# Patient Record
Sex: MALE | Race: WHITE | NOT HISPANIC OR LATINO | URBAN - METROPOLITAN AREA
[De-identification: names, ages, dates, MRNs, and addresses within clinical notes are randomized per-mention and may not be internally consistent; named-entity substitution may affect disease eponyms.]

---

## 2019-01-25 ENCOUNTER — OUTPATIENT (OUTPATIENT)
Dept: OUTPATIENT SERVICES | Facility: HOSPITAL | Age: 59
LOS: 1 days | Discharge: HOME | End: 2019-01-25

## 2019-01-25 ENCOUNTER — APPOINTMENT (OUTPATIENT)
Dept: SPEECH THERAPY | Facility: CLINIC | Age: 59
End: 2019-01-25

## 2019-01-25 DIAGNOSIS — Z87.898 PERSONAL HISTORY OF OTHER SPECIFIED CONDITIONS: Chronic | ICD-10-CM

## 2019-01-25 DIAGNOSIS — Z98.89 OTHER SPECIFIED POSTPROCEDURAL STATES: Chronic | ICD-10-CM

## 2019-01-25 DIAGNOSIS — D48.9 NEOPLASM OF UNCERTAIN BEHAVIOR, UNSPECIFIED: Chronic | ICD-10-CM

## 2019-01-25 DIAGNOSIS — H26.9 UNSPECIFIED CATARACT: Chronic | ICD-10-CM

## 2019-01-29 DIAGNOSIS — H90.5 UNSPECIFIED SENSORINEURAL HEARING LOSS: ICD-10-CM

## 2019-09-04 ENCOUNTER — APPOINTMENT (OUTPATIENT)
Dept: SURGERY | Facility: CLINIC | Age: 59
End: 2019-09-04
Payer: COMMERCIAL

## 2019-09-04 VITALS
DIASTOLIC BLOOD PRESSURE: 81 MMHG | WEIGHT: 193 LBS | SYSTOLIC BLOOD PRESSURE: 131 MMHG | TEMPERATURE: 98.4 F | HEART RATE: 64 BPM | HEIGHT: 69 IN | BODY MASS INDEX: 28.58 KG/M2 | RESPIRATION RATE: 16 BRPM | OXYGEN SATURATION: 97 %

## 2019-09-04 PROCEDURE — 99244 OFF/OP CNSLTJ NEW/EST MOD 40: CPT

## 2019-09-04 RX ORDER — TELMISARTAN 20 MG/1
20 TABLET ORAL
Refills: 0 | Status: ACTIVE | COMMUNITY

## 2019-09-04 RX ORDER — OMEPRAZOLE 20 MG/1
20 CAPSULE, DELAYED RELEASE ORAL
Refills: 0 | Status: ACTIVE | COMMUNITY

## 2019-09-04 RX ORDER — METFORMIN HYDROCHLORIDE 500 MG/1
500 TABLET, COATED ORAL
Refills: 0 | Status: ACTIVE | COMMUNITY

## 2019-09-04 RX ORDER — MELOXICAM 15 MG/1
15 TABLET ORAL
Refills: 0 | Status: ACTIVE | COMMUNITY

## 2019-09-04 NOTE — HISTORY OF PRESENT ILLNESS
[de-identified] : This 58-year-old gentleman underwent incision and drainage of a an infected sebaceous cyst on the posterior neck about 5 years ago. He presents today with a cyst in the same area which is nontender

## 2019-09-04 NOTE — PHYSICAL EXAM
[Normal Breath Sounds] : Normal breath sounds [Normal Heart Sounds] : normal heart sounds [Calm] : calm [No Rash or Lesion] : No rash or lesion [de-identified] : ormal [de-identified] : ormal [de-identified] : there is a firm mass of the posterior neck measuring 2 x 3 cm. This is nontender nonfluctuant and there is no erythema. [de-identified] : soft and nontender [de-identified] : normal [de-identified] : ormal

## 2019-09-04 NOTE — CONSULT LETTER
[Dear  ___] : Dear  [unfilled], [Consult Letter:] : I had the pleasure of evaluating your patient, [unfilled]. [Please see my note below.] : Please see my note below. [Sincerely,] : Sincerely, [FreeTextEntry3] : Joe Reddy M.D.

## 2019-09-04 NOTE — PLAN
[FreeTextEntry1] : This patient is suffering from recurrent sebaceous cyst on the posterior neck. I will schedule this patient for excision of this lesion in the upcoming weeks

## 2019-10-04 ENCOUNTER — OUTPATIENT (OUTPATIENT)
Dept: OUTPATIENT SERVICES | Facility: HOSPITAL | Age: 59
LOS: 1 days | End: 2019-10-04
Payer: COMMERCIAL

## 2019-10-04 VITALS
TEMPERATURE: 98 F | DIASTOLIC BLOOD PRESSURE: 80 MMHG | RESPIRATION RATE: 18 BRPM | HEART RATE: 63 BPM | SYSTOLIC BLOOD PRESSURE: 124 MMHG | OXYGEN SATURATION: 97 % | WEIGHT: 195.11 LBS | HEIGHT: 69 IN

## 2019-10-04 DIAGNOSIS — Z98.89 OTHER SPECIFIED POSTPROCEDURAL STATES: Chronic | ICD-10-CM

## 2019-10-04 DIAGNOSIS — L72.3 SEBACEOUS CYST: ICD-10-CM

## 2019-10-04 DIAGNOSIS — J34.2 DEVIATED NASAL SEPTUM: Chronic | ICD-10-CM

## 2019-10-04 DIAGNOSIS — Z87.898 PERSONAL HISTORY OF OTHER SPECIFIED CONDITIONS: Chronic | ICD-10-CM

## 2019-10-04 DIAGNOSIS — H26.9 UNSPECIFIED CATARACT: Chronic | ICD-10-CM

## 2019-10-04 DIAGNOSIS — Z01.818 ENCOUNTER FOR OTHER PREPROCEDURAL EXAMINATION: ICD-10-CM

## 2019-10-04 DIAGNOSIS — D48.9 NEOPLASM OF UNCERTAIN BEHAVIOR, UNSPECIFIED: Chronic | ICD-10-CM

## 2019-10-04 DIAGNOSIS — L72.9 FOLLICULAR CYST OF THE SKIN AND SUBCUTANEOUS TISSUE, UNSPECIFIED: Chronic | ICD-10-CM

## 2019-10-04 PROCEDURE — 80048 BASIC METABOLIC PNL TOTAL CA: CPT

## 2019-10-04 PROCEDURE — 85027 COMPLETE CBC AUTOMATED: CPT

## 2019-10-04 PROCEDURE — 83036 HEMOGLOBIN GLYCOSYLATED A1C: CPT

## 2019-10-04 PROCEDURE — G0463: CPT

## 2019-10-04 RX ORDER — LIDOCAINE HCL 20 MG/ML
0.2 VIAL (ML) INJECTION ONCE
Refills: 0 | Status: DISCONTINUED | OUTPATIENT
Start: 2019-10-08 | End: 2019-10-25

## 2019-10-04 RX ORDER — SODIUM CHLORIDE 9 MG/ML
3 INJECTION INTRAMUSCULAR; INTRAVENOUS; SUBCUTANEOUS EVERY 8 HOURS
Refills: 0 | Status: DISCONTINUED | OUTPATIENT
Start: 2019-10-08 | End: 2019-10-25

## 2019-10-04 RX ORDER — CHLORHEXIDINE GLUCONATE 213 G/1000ML
1 SOLUTION TOPICAL DAILY
Refills: 0 | Status: DISCONTINUED | OUTPATIENT
Start: 2019-10-08 | End: 2019-10-25

## 2019-10-04 NOTE — H&P PST ADULT - NSICDXPROBLEM_GEN_ALL_CORE_FT
PROBLEM DIAGNOSES  Problem: Sebaceous cyst  Assessment and Plan: coming in for excision of cyst posterior neck

## 2019-10-04 NOTE — H&P PST ADULT - NSICDXPASTSURGICALHX_GEN_ALL_CORE_FT
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

## 2019-10-04 NOTE — H&P PST ADULT - NSICDXFAMILYHX_GEN_ALL_CORE_FT
FAMILY HISTORY:  Father  Still living? No  Family history of colon cancer, Age at diagnosis: Age Unknown  Family history of diabetes mellitus, Age at diagnosis: Age Unknown    Mother  Still living? Yes, Estimated age: Age Unknown  Family history of aortic aneurysm, Age at diagnosis: Age Unknown  Family history of breast cancer, Age at diagnosis: Age Unknown    Grandparent  Still living? No  Family history of aortic aneurysm, Age at diagnosis: Age Unknown

## 2019-10-04 NOTE — H&P PST ADULT - NSICDXPASTMEDICALHX_GEN_ALL_CORE_FT
PAST MEDICAL HISTORY:  Chronic maxillary sinusitis     Diabetes mellitus pre    Dyslipidemia     Essential hypertension     GERD (gastroesophageal reflux disease)     [place] (hard of hearing) bilat hearing aides    Obstructive sleep apnea h/o UPPP done 5/2005 pt states never retested & no longer required CPAP mechine    Overactive bladder     Weight loss 60lbs

## 2019-10-07 ENCOUNTER — TRANSCRIPTION ENCOUNTER (OUTPATIENT)
Age: 59
End: 2019-10-07

## 2019-10-08 ENCOUNTER — OUTPATIENT (OUTPATIENT)
Dept: OUTPATIENT SERVICES | Facility: HOSPITAL | Age: 59
LOS: 1 days | End: 2019-10-08
Payer: COMMERCIAL

## 2019-10-08 ENCOUNTER — APPOINTMENT (OUTPATIENT)
Dept: SURGERY | Facility: HOSPITAL | Age: 59
End: 2019-10-08
Payer: COMMERCIAL

## 2019-10-08 ENCOUNTER — RESULT REVIEW (OUTPATIENT)
Age: 59
End: 2019-10-08

## 2019-10-08 VITALS
RESPIRATION RATE: 14 BRPM | WEIGHT: 195.11 LBS | SYSTOLIC BLOOD PRESSURE: 119 MMHG | HEART RATE: 71 BPM | OXYGEN SATURATION: 95 % | HEIGHT: 69 IN | TEMPERATURE: 98 F | DIASTOLIC BLOOD PRESSURE: 75 MMHG

## 2019-10-08 VITALS
SYSTOLIC BLOOD PRESSURE: 120 MMHG | OXYGEN SATURATION: 98 % | RESPIRATION RATE: 16 BRPM | DIASTOLIC BLOOD PRESSURE: 78 MMHG | TEMPERATURE: 98 F | HEART RATE: 69 BPM

## 2019-10-08 DIAGNOSIS — D48.9 NEOPLASM OF UNCERTAIN BEHAVIOR, UNSPECIFIED: Chronic | ICD-10-CM

## 2019-10-08 DIAGNOSIS — J34.2 DEVIATED NASAL SEPTUM: Chronic | ICD-10-CM

## 2019-10-08 DIAGNOSIS — H26.9 UNSPECIFIED CATARACT: Chronic | ICD-10-CM

## 2019-10-08 DIAGNOSIS — Z98.89 OTHER SPECIFIED POSTPROCEDURAL STATES: Chronic | ICD-10-CM

## 2019-10-08 DIAGNOSIS — Z87.898 PERSONAL HISTORY OF OTHER SPECIFIED CONDITIONS: Chronic | ICD-10-CM

## 2019-10-08 DIAGNOSIS — L72.9 FOLLICULAR CYST OF THE SKIN AND SUBCUTANEOUS TISSUE, UNSPECIFIED: Chronic | ICD-10-CM

## 2019-10-08 DIAGNOSIS — L72.3 SEBACEOUS CYST: ICD-10-CM

## 2019-10-08 LAB — GLUCOSE BLDC GLUCOMTR-MCNC: 119 MG/DL — HIGH (ref 70–99)

## 2019-10-08 PROCEDURE — 88304 TISSUE EXAM BY PATHOLOGIST: CPT | Mod: 26

## 2019-10-08 PROCEDURE — 11424 EXC H-F-NK-SP B9+MARG 3.1-4: CPT

## 2019-10-08 PROCEDURE — 88304 TISSUE EXAM BY PATHOLOGIST: CPT

## 2019-10-08 PROCEDURE — 82962 GLUCOSE BLOOD TEST: CPT

## 2019-10-08 RX ORDER — SODIUM CHLORIDE 9 MG/ML
1000 INJECTION, SOLUTION INTRAVENOUS
Refills: 0 | Status: DISCONTINUED | OUTPATIENT
Start: 2019-10-08 | End: 2019-10-25

## 2019-10-08 RX ORDER — ONDANSETRON 8 MG/1
4 TABLET, FILM COATED ORAL ONCE
Refills: 0 | Status: DISCONTINUED | OUTPATIENT
Start: 2019-10-08 | End: 2019-10-25

## 2019-10-08 RX ORDER — OXYCODONE HYDROCHLORIDE 5 MG/1
5 TABLET ORAL ONCE
Refills: 0 | Status: DISCONTINUED | OUTPATIENT
Start: 2019-10-08 | End: 2019-10-08

## 2019-10-08 RX ORDER — CELECOXIB 200 MG/1
200 CAPSULE ORAL ONCE
Refills: 0 | Status: COMPLETED | OUTPATIENT
Start: 2019-10-08 | End: 2019-10-08

## 2019-10-08 RX ORDER — ACETAMINOPHEN 500 MG
1000 TABLET ORAL ONCE
Refills: 0 | Status: COMPLETED | OUTPATIENT
Start: 2019-10-08 | End: 2019-10-08

## 2019-10-08 RX ADMIN — CELECOXIB 200 MILLIGRAM(S): 200 CAPSULE ORAL at 14:06

## 2019-10-08 RX ADMIN — CHLORHEXIDINE GLUCONATE 1 APPLICATION(S): 213 SOLUTION TOPICAL at 14:05

## 2019-10-08 RX ADMIN — Medication 1000 MILLIGRAM(S): at 14:05

## 2019-10-08 NOTE — ASU DISCHARGE PLAN (ADULT/PEDIATRIC) - ASU DC SPECIAL INSTRUCTIONSFT
DIET: You may resume your regular diet.  BATHING: Please do not submerge wound underwater for one week. You may shower and/or sponge bathe starting tomorrow.   ACTIVITY: No heavy lifting or straining for 2 weeks. Otherwise, you may return to your usual level of physical activity. If you are taking narcotic pain medication (such as Percocet) DO NOT drive or make important decisions.  NOTIFY YOUR SURGEON IF: You have any bleeding that does not stop, any pus draining from your wound(s), any fever (over 100.4 F) or chills, persistent nausea/vomiting, persistent diarrhea, or if your pain is not controlled on your discharge pain medications.  WOUND CARE: Please remove outer clear dressing after 24 hours. Underneath outer dressing you have stitches in place. They will be removed on your follow up visit with Dr. Reddy. Please keep incisions clean and dry. Please do not scrub or rub incisions. Do not use lotion or powder on incisions.

## 2019-10-08 NOTE — PRE-ANESTHESIA EVALUATION ADULT - NSANTHPMHFT_GEN_ALL_CORE
has hx of RYAN sionce uvulectomy plus weight loss no snoring/never given cpap  bgl 119  gerd controlled

## 2019-10-08 NOTE — ASU PATIENT PROFILE, ADULT - ABILITY TO HEAR (WITH HEARING AID OR HEARING APPLIANCE IF NORMALLY USED):
Severely Impaired: absence of useful hearing/bilateral hearing aides Severely Impaired: absence of useful hearing/bilateral hearing aides pt wearing

## 2019-10-08 NOTE — ASU DISCHARGE PLAN (ADULT/PEDIATRIC) - CARE PROVIDER_API CALL
Joe Reddy)  Surgery  [address], [address]  [place] [address]  Phone: [phone]  Fax: [phone]  Follow Up Time:

## 2019-10-08 NOTE — ASU DISCHARGE PLAN (ADULT/PEDIATRIC) - NURSING INSTRUCTIONS
Next dose of Tylenol will be on or after 8pm tonight, If needed for pain/cramps. Your first dose of Tylenol was given at  2pm_. Do not exceed more than 4000mg of Tylenol in one 24 hour setting.  Next dose of NSAIDS (Motrin/Alleve) will be on or after 6pm today if needed for pain

## 2019-10-08 NOTE — ASU PATIENT PROFILE, ADULT - PSH
Bilateral cataracts  11/2013  Deviated septum  nasal cyst 10/2015  Giant cell tumor  excision left tibia 12/2004 - benign  H/O arthroscopy of right knee  meniscus injury 2002  H/O lipoma  eexcision from chest & back 4/2011 2012 - chest & axilia  History of cholecystectomy  2003  History of tonsillectomy  1968  S/P UPPP (uvulopalatopharyngoplasty)  h/o 2005  Scrotal cyst  12/2011

## 2019-10-09 RX ORDER — ASPIRIN/CALCIUM CARB/MAGNESIUM 324 MG
1 TABLET ORAL
Qty: 0 | Refills: 0 | DISCHARGE
Start: 2019-10-09

## 2019-10-14 LAB — SURGICAL PATHOLOGY STUDY: SIGNIFICANT CHANGE UP

## 2019-10-23 ENCOUNTER — APPOINTMENT (OUTPATIENT)
Dept: SURGERY | Facility: CLINIC | Age: 59
End: 2019-10-23
Payer: COMMERCIAL

## 2019-10-23 VITALS
TEMPERATURE: 98.5 F | SYSTOLIC BLOOD PRESSURE: 135 MMHG | DIASTOLIC BLOOD PRESSURE: 88 MMHG | HEART RATE: 82 BPM | OXYGEN SATURATION: 96 % | RESPIRATION RATE: 16 BRPM

## 2019-10-23 DIAGNOSIS — K42.9 UMBILICAL HERNIA W/OUT OBSTRUCTION OR GANGRENE: ICD-10-CM

## 2019-10-23 DIAGNOSIS — Z22.322 CARRIER OR SUSPECTED CARRIER OF METHICILLIN RESISTANT STAPHYLOCOCCUS AUREUS: ICD-10-CM

## 2019-10-23 DIAGNOSIS — Z87.2 PERSONAL HISTORY OF DISEASES OF THE SKIN AND SUBCUTANEOUS TISSUE: ICD-10-CM

## 2019-10-23 DIAGNOSIS — Z09 ENCOUNTER FOR FOLLOW-UP EXAMINATION AFTER COMPLETED TREATMENT FOR CONDITIONS OTHER THAN MALIGNANT NEOPLASM: ICD-10-CM

## 2019-10-23 DIAGNOSIS — M62.08 SEPARATION OF MUSCLE (NONTRAUMATIC), OTHER SITE: ICD-10-CM

## 2019-10-23 PROCEDURE — 99214 OFFICE O/P EST MOD 30 MIN: CPT

## 2019-10-23 NOTE — HISTORY OF PRESENT ILLNESS
[de-identified] : This patient has no complaints referring to his neck surgery. He complains of a large hernia of the abdominal wall. He has asked me to assess his hernia.  He denies any pain nausea emesis or change in bowel or urinary habits

## 2019-10-23 NOTE — ASSESSMENT
[FreeTextEntry1] : This patient is recovering nicely from his excision of neck mass. I have asked him to obtain a CAT scan of the abdomen and pelvis to better define the incisional hernia about the umbilicus.

## 2019-10-23 NOTE — PHYSICAL EXAM
[de-identified] : The wound is clean dry and well healed. Sutures were removed and Steri-Strips applied. [de-identified] : Soft nontender and nondistended. Diastasis recti is present. There is a small reducible nontender umbilical hernia there is the suspicion of a second hernia defect just superior to the umbilicus.

## 2019-11-26 ENCOUNTER — APPOINTMENT (OUTPATIENT)
Dept: SPEECH THERAPY | Facility: CLINIC | Age: 59
End: 2019-11-26

## 2019-11-27 ENCOUNTER — TRANSCRIPTION ENCOUNTER (OUTPATIENT)
Age: 59
End: 2019-11-27

## 2019-12-02 RX ORDER — CEFDINIR 250 MG/5ML
1 POWDER, FOR SUSPENSION ORAL
Qty: 0 | Refills: 0 | DISCHARGE
Start: 2019-12-02 | End: 2019-12-11

## 2019-12-05 DIAGNOSIS — K43.2 INCISIONAL HERNIA W/OUT OBSTRUCTION OR GANGRENE: ICD-10-CM

## 2019-12-12 ENCOUNTER — APPOINTMENT (OUTPATIENT)
Dept: SPEECH THERAPY | Facility: CLINIC | Age: 59
End: 2019-12-12

## 2020-01-04 ENCOUNTER — EMERGENCY (EMERGENCY)
Facility: HOSPITAL | Age: 60
LOS: 0 days | Discharge: HOME | End: 2020-01-04
Attending: EMERGENCY MEDICINE
Payer: COMMERCIAL

## 2020-01-04 ENCOUNTER — TRANSCRIPTION ENCOUNTER (OUTPATIENT)
Age: 60
End: 2020-01-04

## 2020-01-04 VITALS
SYSTOLIC BLOOD PRESSURE: 134 MMHG | OXYGEN SATURATION: 98 % | HEART RATE: 91 BPM | TEMPERATURE: 101 F | DIASTOLIC BLOOD PRESSURE: 74 MMHG | RESPIRATION RATE: 18 BRPM

## 2020-01-04 VITALS — HEIGHT: 69 IN | WEIGHT: 195.11 LBS

## 2020-01-04 DIAGNOSIS — K21.9 GASTRO-ESOPHAGEAL REFLUX DISEASE WITHOUT ESOPHAGITIS: ICD-10-CM

## 2020-01-04 DIAGNOSIS — Z98.89 OTHER SPECIFIED POSTPROCEDURAL STATES: Chronic | ICD-10-CM

## 2020-01-04 DIAGNOSIS — R31.9 HEMATURIA, UNSPECIFIED: ICD-10-CM

## 2020-01-04 DIAGNOSIS — I10 ESSENTIAL (PRIMARY) HYPERTENSION: ICD-10-CM

## 2020-01-04 DIAGNOSIS — Z87.898 PERSONAL HISTORY OF OTHER SPECIFIED CONDITIONS: Chronic | ICD-10-CM

## 2020-01-04 DIAGNOSIS — N41.9 INFLAMMATORY DISEASE OF PROSTATE, UNSPECIFIED: ICD-10-CM

## 2020-01-04 DIAGNOSIS — N40.0 BENIGN PROSTATIC HYPERPLASIA WITHOUT LOWER URINARY TRACT SYMPTOMS: ICD-10-CM

## 2020-01-04 DIAGNOSIS — D48.9 NEOPLASM OF UNCERTAIN BEHAVIOR, UNSPECIFIED: Chronic | ICD-10-CM

## 2020-01-04 DIAGNOSIS — J34.2 DEVIATED NASAL SEPTUM: Chronic | ICD-10-CM

## 2020-01-04 DIAGNOSIS — H26.9 UNSPECIFIED CATARACT: Chronic | ICD-10-CM

## 2020-01-04 DIAGNOSIS — R73.03 PREDIABETES: ICD-10-CM

## 2020-01-04 DIAGNOSIS — L72.9 FOLLICULAR CYST OF THE SKIN AND SUBCUTANEOUS TISSUE, UNSPECIFIED: Chronic | ICD-10-CM

## 2020-01-04 LAB
ALBUMIN SERPL ELPH-MCNC: 4 G/DL — SIGNIFICANT CHANGE UP (ref 3.3–5)
ALP SERPL-CCNC: 75 U/L — SIGNIFICANT CHANGE UP (ref 40–120)
ALT FLD-CCNC: 34 U/L — SIGNIFICANT CHANGE UP (ref 12–78)
ANION GAP SERPL CALC-SCNC: 5 MMOL/L — SIGNIFICANT CHANGE UP (ref 5–17)
APPEARANCE UR: ABNORMAL
APTT BLD: 27.3 SEC — LOW (ref 27.5–36.3)
AST SERPL-CCNC: 16 U/L — SIGNIFICANT CHANGE UP (ref 15–37)
BASOPHILS # BLD AUTO: 0.02 K/UL — SIGNIFICANT CHANGE UP (ref 0–0.2)
BASOPHILS NFR BLD AUTO: 0.4 % — SIGNIFICANT CHANGE UP (ref 0–2)
BILIRUB SERPL-MCNC: 2.1 MG/DL — HIGH (ref 0.2–1.2)
BILIRUB UR-MCNC: ABNORMAL
BUN SERPL-MCNC: 16 MG/DL — SIGNIFICANT CHANGE UP (ref 7–23)
CALCIUM SERPL-MCNC: 10 MG/DL — SIGNIFICANT CHANGE UP (ref 8.5–10.1)
CHLORIDE SERPL-SCNC: 102 MMOL/L — SIGNIFICANT CHANGE UP (ref 96–108)
CO2 SERPL-SCNC: 29 MMOL/L — SIGNIFICANT CHANGE UP (ref 22–31)
COLOR SPEC: ABNORMAL
CREAT SERPL-MCNC: 0.7 MG/DL — SIGNIFICANT CHANGE UP (ref 0.5–1.3)
DIFF PNL FLD: ABNORMAL
EOSINOPHIL # BLD AUTO: 0.06 K/UL — SIGNIFICANT CHANGE UP (ref 0–0.5)
EOSINOPHIL NFR BLD AUTO: 1.1 % — SIGNIFICANT CHANGE UP (ref 0–6)
GLUCOSE SERPL-MCNC: 141 MG/DL — HIGH (ref 70–99)
GLUCOSE UR QL: NEGATIVE MG/DL — SIGNIFICANT CHANGE UP
HCT VFR BLD CALC: 48.5 % — SIGNIFICANT CHANGE UP (ref 39–50)
HGB BLD-MCNC: 16.2 G/DL — SIGNIFICANT CHANGE UP (ref 13–17)
IMM GRANULOCYTES NFR BLD AUTO: 0.2 % — SIGNIFICANT CHANGE UP (ref 0–1.5)
INR BLD: 1.1 RATIO — SIGNIFICANT CHANGE UP (ref 0.88–1.16)
KETONES UR-MCNC: ABNORMAL
LEUKOCYTE ESTERASE UR-ACNC: ABNORMAL
LYMPHOCYTES # BLD AUTO: 0.8 K/UL — LOW (ref 1–3.3)
LYMPHOCYTES # BLD AUTO: 15.3 % — SIGNIFICANT CHANGE UP (ref 13–44)
MCHC RBC-ENTMCNC: 29 PG — SIGNIFICANT CHANGE UP (ref 27–34)
MCHC RBC-ENTMCNC: 33.4 GM/DL — SIGNIFICANT CHANGE UP (ref 32–36)
MCV RBC AUTO: 86.8 FL — SIGNIFICANT CHANGE UP (ref 80–100)
MONOCYTES # BLD AUTO: 0.82 K/UL — SIGNIFICANT CHANGE UP (ref 0–0.9)
MONOCYTES NFR BLD AUTO: 15.7 % — HIGH (ref 2–14)
NEUTROPHILS # BLD AUTO: 3.51 K/UL — SIGNIFICANT CHANGE UP (ref 1.8–7.4)
NEUTROPHILS NFR BLD AUTO: 67.3 % — SIGNIFICANT CHANGE UP (ref 43–77)
NITRITE UR-MCNC: POSITIVE
PH UR: 6.5 — SIGNIFICANT CHANGE UP (ref 5–8)
PLATELET # BLD AUTO: 160 K/UL — SIGNIFICANT CHANGE UP (ref 150–400)
POTASSIUM SERPL-MCNC: 4.2 MMOL/L — SIGNIFICANT CHANGE UP (ref 3.5–5.3)
POTASSIUM SERPL-SCNC: 4.2 MMOL/L — SIGNIFICANT CHANGE UP (ref 3.5–5.3)
PROT SERPL-MCNC: 7.5 GM/DL — SIGNIFICANT CHANGE UP (ref 6–8.3)
PROT UR-MCNC: 500 MG/DL
PROTHROM AB SERPL-ACNC: 12.2 SEC — SIGNIFICANT CHANGE UP (ref 10–12.9)
RBC # BLD: 5.59 M/UL — SIGNIFICANT CHANGE UP (ref 4.2–5.8)
RBC # FLD: 13.5 % — SIGNIFICANT CHANGE UP (ref 10.3–14.5)
SODIUM SERPL-SCNC: 136 MMOL/L — SIGNIFICANT CHANGE UP (ref 135–145)
SP GR SPEC: 1.02 — SIGNIFICANT CHANGE UP (ref 1.01–1.02)
UROBILINOGEN FLD QL: 4 MG/DL
WBC # BLD: 5.22 K/UL — SIGNIFICANT CHANGE UP (ref 3.8–10.5)
WBC # FLD AUTO: 5.22 K/UL — SIGNIFICANT CHANGE UP (ref 3.8–10.5)

## 2020-01-04 PROCEDURE — 36415 COLL VENOUS BLD VENIPUNCTURE: CPT

## 2020-01-04 PROCEDURE — 80053 COMPREHEN METABOLIC PANEL: CPT

## 2020-01-04 PROCEDURE — 96374 THER/PROPH/DIAG INJ IV PUSH: CPT

## 2020-01-04 PROCEDURE — 87186 SC STD MICRODIL/AGAR DIL: CPT

## 2020-01-04 PROCEDURE — 85610 PROTHROMBIN TIME: CPT

## 2020-01-04 PROCEDURE — 74176 CT ABD & PELVIS W/O CONTRAST: CPT | Mod: 26

## 2020-01-04 PROCEDURE — 74176 CT ABD & PELVIS W/O CONTRAST: CPT

## 2020-01-04 PROCEDURE — 87040 BLOOD CULTURE FOR BACTERIA: CPT

## 2020-01-04 PROCEDURE — 86901 BLOOD TYPING SEROLOGIC RH(D): CPT

## 2020-01-04 PROCEDURE — 99284 EMERGENCY DEPT VISIT MOD MDM: CPT | Mod: 25

## 2020-01-04 PROCEDURE — 99284 EMERGENCY DEPT VISIT MOD MDM: CPT

## 2020-01-04 PROCEDURE — 81001 URINALYSIS AUTO W/SCOPE: CPT

## 2020-01-04 PROCEDURE — 87086 URINE CULTURE/COLONY COUNT: CPT

## 2020-01-04 PROCEDURE — 86850 RBC ANTIBODY SCREEN: CPT

## 2020-01-04 PROCEDURE — 85025 COMPLETE CBC W/AUTO DIFF WBC: CPT

## 2020-01-04 PROCEDURE — 86900 BLOOD TYPING SEROLOGIC ABO: CPT

## 2020-01-04 PROCEDURE — 85730 THROMBOPLASTIN TIME PARTIAL: CPT

## 2020-01-04 RX ORDER — CEFTRIAXONE 500 MG/1
1000 INJECTION, POWDER, FOR SOLUTION INTRAMUSCULAR; INTRAVENOUS ONCE
Refills: 0 | Status: DISCONTINUED | OUTPATIENT
Start: 2020-01-04 | End: 2020-01-04

## 2020-01-04 RX ORDER — AZTREONAM 2 G
1 VIAL (EA) INJECTION
Qty: 20 | Refills: 0
Start: 2020-01-04 | End: 2020-01-13

## 2020-01-04 RX ORDER — ACETAMINOPHEN 500 MG
1000 TABLET ORAL ONCE
Refills: 0 | Status: COMPLETED | OUTPATIENT
Start: 2020-01-04 | End: 2020-01-04

## 2020-01-04 RX ORDER — CEFTRIAXONE 500 MG/1
1000 INJECTION, POWDER, FOR SOLUTION INTRAMUSCULAR; INTRAVENOUS ONCE
Refills: 0 | Status: COMPLETED | OUTPATIENT
Start: 2020-01-04 | End: 2020-01-04

## 2020-01-04 RX ADMIN — CEFTRIAXONE 1000 MILLIGRAM(S): 500 INJECTION, POWDER, FOR SOLUTION INTRAMUSCULAR; INTRAVENOUS at 16:07

## 2020-01-04 RX ADMIN — Medication 1000 MILLIGRAM(S): at 18:10

## 2020-01-04 NOTE — ED PROVIDER NOTE - NS_ ATTENDINGSCRIBEDETAILS _ED_A_ED_FT
I, Nathaniel Leggett MD,  performed the initial face to face bedside interview with this patient regarding history of present illness, review of symptoms and relevant past medical, social and family history.  I completed an independent physical examination.  I was the initial provider who evaluated this patient.  The history, relevant review of systems, past medical and surgical history, medical decision making, and physical examination was documented by the scribe in my presence and I attest to the accuracy of the documentation.

## 2020-01-04 NOTE — ED PROVIDER NOTE - PATIENT PORTAL LINK FT
You can access the FollowMyHealth Patient Portal offered by [facility] by registering at the following website: [email or web address]. By joining [employer]’s FollowMyHealth portal, you will also be able to view your health information using other applications (apps) compatible with our system.

## 2020-01-04 NOTE — ED STATDOCS - PMH
Chronic maxillary sinusitis    Diabetes mellitus  pre  Dyslipidemia    Essential hypertension    GERD (gastroesophageal reflux disease)    [place] (hard of hearing)  bilat hearing aides  Obstructive sleep apnea  h/o UPPP done 5/2005 pt states never retested & no longer required CPAP mechine  Overactive bladder    Umbilical hernia    Weight loss  60lbs

## 2020-01-04 NOTE — ED PROVIDER NOTE - PROGRESS NOTE DETAILS
patient feeling much better. vss. labs with + UA and ct with prostatitis/cystitis. no blood in aponte. pt wants to go home. will follow up with urologist this week. will d/c with abx and strict return precautions. Nathaniel Leggett M.D., Attending Physician

## 2020-01-04 NOTE — ED PROVIDER NOTE - NSFOLLOWUPINSTRUCTIONS_ED_ALL_ED_FT
1. return for worsening symptoms or anything concerning to you  2. take all home meds as prescribed  3. follow up with your pmd call to make an appointment  4. take bactrim as directed  5. follow up with your urologist this week    Urinary Tract Infection, Adult  ImageA urinary tract infection (UTI) is an infection of any part of the urinary tract, which includes the kidneys, ureters, bladder, and urethra. These organs make, store, and get rid of urine in the body. UTI can be a bladder infection (cystitis) or kidney infection (pyelonephritis).    What are the causes?  This infection may be caused by fungi, viruses, or bacteria. Bacteria are the most common cause of UTIs. This condition can also be caused by repeated incomplete emptying of the bladder during urination.    What increases the risk?  This condition is more likely to develop if:    You ignore your need to urinate or hold urine for long periods of time.  You do not empty your bladder completely during urination.  You wipe back to front after urinating or having a bowel movement, if you are female.  You are uncircumcised, if you are male.  You are constipated.  You have a urinary catheter that stays in place (indwelling).  You have a weak defense (immune) system.  You have a medical condition that affects your bowels, kidneys, or bladder.  You have diabetes.  You take antibiotic medicines frequently or for long periods of time, and the antibiotics no longer work well against certain types of infections (antibiotic resistance).  You take medicines that irritate your urinary tract.  You are exposed to chemicals that irritate your urinary tract.  You are female.    What are the signs or symptoms?  Symptoms of this condition include:    Fever.  Frequent urination or passing small amounts of urine frequently.  Needing to urinate urgently.  Pain or burning with urination.  Urine that smells bad or unusual.  Cloudy urine.  Pain in the lower abdomen or back.  Trouble urinating.  Blood in the urine.  Vomiting or being less hungry than normal.  Diarrhea or abdominal pain.  Vaginal discharge, if you are female.    How is this diagnosed?  This condition is diagnosed with a medical history and physical exam. You will also need to provide a urine sample to test your urine. Other tests may be done, including:    Blood tests.  Sexually transmitted disease (STD) testing.    If you have had more than one UTI, a cystoscopy or imaging studies may be done to determine the cause of the infections.    How is this treated?  Treatment for this condition often includes a combination of two or more of the following:    Antibiotic medicine.  Other medicines to treat less common causes of UTI.  Over-the-counter medicines to treat pain.  Drinking enough water to stay hydrated.    Follow these instructions at home:  Take over-the-counter and prescription medicines only as told by your health care provider.  If you were prescribed an antibiotic, take it as told by your health care provider. Do not stop taking the antibiotic even if you start to feel better.  Avoid alcohol, caffeine, tea, and carbonated beverages. They can irritate your bladder.  Drink enough fluid to keep your urine clear or pale yellow.  Keep all follow-up visits as told by your health care provider. This is important.  ImageMake sure to:    Empty your bladder often and completely. Do not hold urine for long periods of time.  Empty your bladder before and after sex.  Wipe from front to back after a bowel movement if you are female. Use each tissue one time when you wipe.    Contact a health care provider if:  You have back pain.  You have a fever.  You feel nauseous or vomit.  Your symptoms do not get better after 3 days.  Your symptoms go away and then return.  Get help right away if:  You have severe back pain or lower abdominal pain.  You are vomiting and cannot keep down any medicines or water.  This information is not intended to replace advice given to you by your health care provider. Make sure you discuss any questions you have with your health care provider.

## 2020-01-04 NOTE — ED PROVIDER NOTE - PMH
Chronic maxillary sinusitis    Diabetes mellitus  pre  Dyslipidemia    Essential hypertension    GERD (gastroesophageal reflux disease)    [place] (hard of hearing)  bilat hearing aides  Obstructive sleep apnea  h/o UPPP done 5/2005 pt states never retested & no longer required CPAP mechine  Overactive bladder    Umbilical hernia    Weight loss  60lbs

## 2020-01-04 NOTE — ED PROVIDER NOTE - CLINICAL SUMMARY MEDICAL DECISION MAKING FREE TEXT BOX
60 y/o male with a PMHx of HTN, HLD, BPH, presents to the ED c/o hematuria worsening today now with some clot, sent in by urologist for evaluation. here exam with distended bladder, mild tenderness, otherwise non focal exam. will place pt on CBI check urine, CT abd and reassess.

## 2020-01-04 NOTE — ED PROVIDER NOTE - OBJECTIVE STATEMENT
58 y/o male with a PMHx of pre-Diabetes mellitus, BPH on Flomax, Hypertension, GERD, Obstructive sleep apnea, Overactive bladder, presents to the ED c/o urinary frequency and chills for past couple days. Secondary c/o worsening hematuria beginning this morning. Pt states that couple nights ago he had urinary frequency, where he would have to urinate every hour but will only urinate "a few drops". Then, this morning noticed blood clots in urine. States he feels uncomfortable, intermittent diaphoretic, abdominal discomfort. No recent trauma. Went to Urgent Care, and was sent to [facility] for further evaluation. Has hx of catheter placement. Takes ASA 81mg daily. PMD: Dr. Jhon Rodgers in [place]. Urologist: Dr. Arvin Parks.

## 2020-01-04 NOTE — ED PROVIDER NOTE - PHYSICAL EXAMINATION
Constitutional: NAD AAOx3  Eyes: PERRLA EOMI  Head: Normocephalic atraumatic  Mouth: MMM  Cardiac: regular rate   Resp: Lungs CTAB  GI: Abd s/nt/nd  : distended bladder, mild tenderness  Neuro: CN2-12 intact  Skin: No rashes Constitutional: NAD AAOx3  Eyes: PERRLA EOMI  Head: Normocephalic atraumatic  Mouth: MMM  Cardiac: regular rate   Resp: Lungs CTAB  GI: Abd s/nt/nd  : distended bladder, mild tenderness over bladder no cvat otherwise s/nt/nd  Neuro: CN2-12 intact  Skin: No rashes

## 2020-01-04 NOTE — ED STATDOCS - PROGRESS NOTE DETAILS
Nolberto Woody for attending Dr. Donovan: 60 y/o male with a PMHx of Chronic maxillary sinusitis, umbilical hernia, Diabetes mellitus. Dyslipidemia, Essential hypertension, GERD, [place], Obstructive sleep apnea, Overactive bladder, Weight loss, presents to the ED c/o urinary frequency and chills for past couple days. Secondary c/o hematuria beginning this morning. Pt states that couple nights would have urinary frequency, where he would have to urinate every few minutes. Then, this morning noticed blood clots in urine. Went to Urgent Care, and was sent to [facility] for further evaluation. Denies fever, hx of hematuria. Had hx of catheter placement. Takes a 81mg ASA everyday. Will send to the main ED for further evaluation.

## 2020-01-04 NOTE — ED PROVIDER NOTE - NS ED ROS FT
Constitutional: No fever, +chills  Eyes: No visual changes  HEENT: No throat pain  CV: No chest pain  Resp: No SOB no cough  GI: No nausea or vomiting, +abdominal discomfort  : +urinary frequency, +urinary retention, +hematuria  MSK: No musculoskeletal pain  Skin: No rash  Neuro: No headache

## 2020-01-04 NOTE — ED ADULT NURSE NOTE - OBJECTIVE STATEMENT
Pt complains of frequent scant dark urine output, sent from urgent care for hematuria.  Three-way Dean placed, 50 cc tea-colored output prior to infusing CBI fluid.  20g PIV placed in LAC.

## 2020-01-07 ENCOUNTER — INPATIENT (INPATIENT)
Facility: HOSPITAL | Age: 60
LOS: 0 days | Discharge: HOME HEALTH | DRG: 690 | End: 2020-01-08
Attending: FAMILY MEDICINE | Admitting: FAMILY MEDICINE
Payer: COMMERCIAL

## 2020-01-07 VITALS — HEIGHT: 69 IN | WEIGHT: 192.9 LBS

## 2020-01-07 DIAGNOSIS — H26.9 UNSPECIFIED CATARACT: Chronic | ICD-10-CM

## 2020-01-07 DIAGNOSIS — Z87.898 PERSONAL HISTORY OF OTHER SPECIFIED CONDITIONS: Chronic | ICD-10-CM

## 2020-01-07 DIAGNOSIS — D48.9 NEOPLASM OF UNCERTAIN BEHAVIOR, UNSPECIFIED: Chronic | ICD-10-CM

## 2020-01-07 DIAGNOSIS — Z98.89 OTHER SPECIFIED POSTPROCEDURAL STATES: Chronic | ICD-10-CM

## 2020-01-07 DIAGNOSIS — L72.9 FOLLICULAR CYST OF THE SKIN AND SUBCUTANEOUS TISSUE, UNSPECIFIED: Chronic | ICD-10-CM

## 2020-01-07 DIAGNOSIS — N39.0 URINARY TRACT INFECTION, SITE NOT SPECIFIED: ICD-10-CM

## 2020-01-07 DIAGNOSIS — J34.2 DEVIATED NASAL SEPTUM: Chronic | ICD-10-CM

## 2020-01-07 PROBLEM — K42.9 UMBILICAL HERNIA WITHOUT OBSTRUCTION OR GANGRENE: Chronic | Status: ACTIVE | Noted: 2020-01-04

## 2020-01-07 LAB
ALBUMIN SERPL ELPH-MCNC: 3.9 G/DL — SIGNIFICANT CHANGE UP (ref 3.3–5)
ALP SERPL-CCNC: 73 U/L — SIGNIFICANT CHANGE UP (ref 40–120)
ALT FLD-CCNC: 48 U/L — SIGNIFICANT CHANGE UP (ref 12–78)
ANION GAP SERPL CALC-SCNC: 6 MMOL/L — SIGNIFICANT CHANGE UP (ref 5–17)
APPEARANCE UR: CLEAR — SIGNIFICANT CHANGE UP
AST SERPL-CCNC: 31 U/L — SIGNIFICANT CHANGE UP (ref 15–37)
BASOPHILS # BLD AUTO: 0.03 K/UL — SIGNIFICANT CHANGE UP (ref 0–0.2)
BASOPHILS NFR BLD AUTO: 0.7 % — SIGNIFICANT CHANGE UP (ref 0–2)
BILIRUB SERPL-MCNC: 0.9 MG/DL — SIGNIFICANT CHANGE UP (ref 0.2–1.2)
BILIRUB UR-MCNC: NEGATIVE — SIGNIFICANT CHANGE UP
BUN SERPL-MCNC: 17 MG/DL — SIGNIFICANT CHANGE UP (ref 7–23)
CALCIUM SERPL-MCNC: 10 MG/DL — SIGNIFICANT CHANGE UP (ref 8.5–10.1)
CHLORIDE SERPL-SCNC: 105 MMOL/L — SIGNIFICANT CHANGE UP (ref 96–108)
CO2 SERPL-SCNC: 28 MMOL/L — SIGNIFICANT CHANGE UP (ref 22–31)
COLOR SPEC: YELLOW — SIGNIFICANT CHANGE UP
CREAT SERPL-MCNC: 0.77 MG/DL — SIGNIFICANT CHANGE UP (ref 0.5–1.3)
DIFF PNL FLD: ABNORMAL
EOSINOPHIL # BLD AUTO: 0.2 K/UL — SIGNIFICANT CHANGE UP (ref 0–0.5)
EOSINOPHIL NFR BLD AUTO: 4.3 % — SIGNIFICANT CHANGE UP (ref 0–6)
GLUCOSE SERPL-MCNC: 134 MG/DL — HIGH (ref 70–99)
GLUCOSE UR QL: NEGATIVE MG/DL — SIGNIFICANT CHANGE UP
HCT VFR BLD CALC: 49 % — SIGNIFICANT CHANGE UP (ref 39–50)
HGB BLD-MCNC: 16.3 G/DL — SIGNIFICANT CHANGE UP (ref 13–17)
IMM GRANULOCYTES NFR BLD AUTO: 0.9 % — SIGNIFICANT CHANGE UP (ref 0–1.5)
KETONES UR-MCNC: NEGATIVE — SIGNIFICANT CHANGE UP
LEUKOCYTE ESTERASE UR-ACNC: ABNORMAL
LYMPHOCYTES # BLD AUTO: 1.67 K/UL — SIGNIFICANT CHANGE UP (ref 1–3.3)
LYMPHOCYTES # BLD AUTO: 36.3 % — SIGNIFICANT CHANGE UP (ref 13–44)
MCHC RBC-ENTMCNC: 28.9 PG — SIGNIFICANT CHANGE UP (ref 27–34)
MCHC RBC-ENTMCNC: 33.3 GM/DL — SIGNIFICANT CHANGE UP (ref 32–36)
MCV RBC AUTO: 86.9 FL — SIGNIFICANT CHANGE UP (ref 80–100)
MONOCYTES # BLD AUTO: 0.73 K/UL — SIGNIFICANT CHANGE UP (ref 0–0.9)
MONOCYTES NFR BLD AUTO: 15.9 % — HIGH (ref 2–14)
NEUTROPHILS # BLD AUTO: 1.93 K/UL — SIGNIFICANT CHANGE UP (ref 1.8–7.4)
NEUTROPHILS NFR BLD AUTO: 41.9 % — LOW (ref 43–77)
NITRITE UR-MCNC: NEGATIVE — SIGNIFICANT CHANGE UP
PH UR: 5 — SIGNIFICANT CHANGE UP (ref 5–8)
PLATELET # BLD AUTO: 214 K/UL — SIGNIFICANT CHANGE UP (ref 150–400)
POTASSIUM SERPL-MCNC: 5.3 MMOL/L — SIGNIFICANT CHANGE UP (ref 3.5–5.3)
POTASSIUM SERPL-SCNC: 5.3 MMOL/L — SIGNIFICANT CHANGE UP (ref 3.5–5.3)
PROT SERPL-MCNC: 7.6 GM/DL — SIGNIFICANT CHANGE UP (ref 6–8.3)
PROT UR-MCNC: NEGATIVE MG/DL — SIGNIFICANT CHANGE UP
RBC # BLD: 5.64 M/UL — SIGNIFICANT CHANGE UP (ref 4.2–5.8)
RBC # FLD: 13.5 % — SIGNIFICANT CHANGE UP (ref 10.3–14.5)
SODIUM SERPL-SCNC: 139 MMOL/L — SIGNIFICANT CHANGE UP (ref 135–145)
SP GR SPEC: 1.02 — SIGNIFICANT CHANGE UP (ref 1.01–1.02)
UROBILINOGEN FLD QL: 1 MG/DL
WBC # BLD: 4.6 K/UL — SIGNIFICANT CHANGE UP (ref 3.8–10.5)
WBC # FLD AUTO: 4.6 K/UL — SIGNIFICANT CHANGE UP (ref 3.8–10.5)

## 2020-01-07 PROCEDURE — 83036 HEMOGLOBIN GLYCOSYLATED A1C: CPT

## 2020-01-07 PROCEDURE — 36415 COLL VENOUS BLD VENIPUNCTURE: CPT

## 2020-01-07 PROCEDURE — 82962 GLUCOSE BLOOD TEST: CPT

## 2020-01-07 PROCEDURE — 86803 HEPATITIS C AB TEST: CPT

## 2020-01-07 RX ORDER — DEXTROSE 50 % IN WATER 50 %
25 SYRINGE (ML) INTRAVENOUS ONCE
Refills: 0 | Status: DISCONTINUED | OUTPATIENT
Start: 2020-01-07 | End: 2020-01-08

## 2020-01-07 RX ORDER — INSULIN LISPRO 100/ML
VIAL (ML) SUBCUTANEOUS
Refills: 0 | Status: DISCONTINUED | OUTPATIENT
Start: 2020-01-07 | End: 2020-01-08

## 2020-01-07 RX ORDER — CELECOXIB 200 MG/1
200 CAPSULE ORAL DAILY
Refills: 0 | Status: DISCONTINUED | OUTPATIENT
Start: 2020-01-08 | End: 2020-01-08

## 2020-01-07 RX ORDER — GLUCAGON INJECTION, SOLUTION 0.5 MG/.1ML
1 INJECTION, SOLUTION SUBCUTANEOUS ONCE
Refills: 0 | Status: DISCONTINUED | OUTPATIENT
Start: 2020-01-07 | End: 2020-01-08

## 2020-01-07 RX ORDER — FLUTICASONE PROPIONATE 50 MCG
0 SPRAY, SUSPENSION NASAL
Qty: 0 | Refills: 0 | DISCHARGE

## 2020-01-07 RX ORDER — DEXTROSE 50 % IN WATER 50 %
15 SYRINGE (ML) INTRAVENOUS ONCE
Refills: 0 | Status: DISCONTINUED | OUTPATIENT
Start: 2020-01-07 | End: 2020-01-08

## 2020-01-07 RX ORDER — PANTOPRAZOLE SODIUM 20 MG/1
40 TABLET, DELAYED RELEASE ORAL
Refills: 0 | Status: DISCONTINUED | OUTPATIENT
Start: 2020-01-08 | End: 2020-01-08

## 2020-01-07 RX ORDER — TAMSULOSIN HYDROCHLORIDE 0.4 MG/1
0.4 CAPSULE ORAL AT BEDTIME
Refills: 0 | Status: DISCONTINUED | OUTPATIENT
Start: 2020-01-07 | End: 2020-01-08

## 2020-01-07 RX ORDER — INSULIN LISPRO 100/ML
VIAL (ML) SUBCUTANEOUS AT BEDTIME
Refills: 0 | Status: DISCONTINUED | OUTPATIENT
Start: 2020-01-07 | End: 2020-01-08

## 2020-01-07 RX ORDER — LOSARTAN POTASSIUM 100 MG/1
100 TABLET, FILM COATED ORAL AT BEDTIME
Refills: 0 | Status: DISCONTINUED | OUTPATIENT
Start: 2020-01-07 | End: 2020-01-08

## 2020-01-07 RX ORDER — MONTELUKAST 4 MG/1
10 TABLET, CHEWABLE ORAL AT BEDTIME
Refills: 0 | Status: DISCONTINUED | OUTPATIENT
Start: 2020-01-07 | End: 2020-01-08

## 2020-01-07 RX ORDER — ACETAMINOPHEN 500 MG
2 TABLET ORAL
Qty: 0 | Refills: 0 | DISCHARGE

## 2020-01-07 RX ORDER — ATORVASTATIN CALCIUM 80 MG/1
80 TABLET, FILM COATED ORAL AT BEDTIME
Refills: 0 | Status: DISCONTINUED | OUTPATIENT
Start: 2020-01-07 | End: 2020-01-08

## 2020-01-07 RX ORDER — ERTAPENEM SODIUM 1 G/1
1000 INJECTION, POWDER, LYOPHILIZED, FOR SOLUTION INTRAMUSCULAR; INTRAVENOUS ONCE
Refills: 0 | Status: COMPLETED | OUTPATIENT
Start: 2020-01-07 | End: 2020-01-07

## 2020-01-07 RX ORDER — SODIUM CHLORIDE 9 MG/ML
1000 INJECTION, SOLUTION INTRAVENOUS
Refills: 0 | Status: DISCONTINUED | OUTPATIENT
Start: 2020-01-07 | End: 2020-01-08

## 2020-01-07 RX ORDER — ERTAPENEM SODIUM 1 G/1
1000 INJECTION, POWDER, LYOPHILIZED, FOR SOLUTION INTRAMUSCULAR; INTRAVENOUS EVERY 24 HOURS
Refills: 0 | Status: DISCONTINUED | OUTPATIENT
Start: 2020-01-08 | End: 2020-01-08

## 2020-01-07 RX ORDER — ASPIRIN/CALCIUM CARB/MAGNESIUM 324 MG
81 TABLET ORAL DAILY
Refills: 0 | Status: DISCONTINUED | OUTPATIENT
Start: 2020-01-07 | End: 2020-01-08

## 2020-01-07 RX ORDER — DEXTROSE 50 % IN WATER 50 %
12.5 SYRINGE (ML) INTRAVENOUS ONCE
Refills: 0 | Status: DISCONTINUED | OUTPATIENT
Start: 2020-01-07 | End: 2020-01-08

## 2020-01-07 RX ADMIN — ERTAPENEM SODIUM 120 MILLIGRAM(S): 1 INJECTION, POWDER, LYOPHILIZED, FOR SOLUTION INTRAMUSCULAR; INTRAVENOUS at 14:12

## 2020-01-07 RX ADMIN — LOSARTAN POTASSIUM 100 MILLIGRAM(S): 100 TABLET, FILM COATED ORAL at 23:06

## 2020-01-07 RX ADMIN — TAMSULOSIN HYDROCHLORIDE 0.4 MILLIGRAM(S): 0.4 CAPSULE ORAL at 23:07

## 2020-01-07 RX ADMIN — ATORVASTATIN CALCIUM 80 MILLIGRAM(S): 80 TABLET, FILM COATED ORAL at 23:07

## 2020-01-07 RX ADMIN — MONTELUKAST 10 MILLIGRAM(S): 4 TABLET, CHEWABLE ORAL at 23:06

## 2020-01-07 NOTE — H&P ADULT - ASSESSMENT
58 yo male with PMH of DM2, HTN, HLD, BPH, h/o recurrent ear infections and sinus infections admitted with:    #ESBL E. coli cystitis/prostatitis  - admit to med-surg  - IV abx - Invanz  - ID consult - Dr. Montoya  - plan for midline tomorrow and outpatient IV abx infusion, pt in agreement with plan    #DM2  - hold metformin  - ISS    #HTN/HLD  - continue home meds    #DVT prophylaxis  - low risk, encourage ambulation    IMPROVE VTE Individual Risk Assessment    RISK                                                                Points    [  ] Previous VTE                                                  3    [  ] Thrombophilia                                               2    [  ] Lower limb paralysis                                      2        (unable to hold up >15 seconds)      [  ] Current Cancer                                              2         (within 6 months)    [  ] Immobilization > 24 hrs                                1    [  ] ICU/CCU stay > 24 hours                              1    [ x ] Age > 60                                                      1    IMPROVE VTE Score ____1_____    IMPROVE Score 0-1: Low Risk, No VTE prophylaxis required for most patients, encourage ambulation.   IMPROVE Score 2-3: At risk, pharmacologic VTE prophylaxis is indicated for most patients (in the absence of a contraindication)  IMPROVE Score > or = 4: High Risk, pharmacologic VTE prophylaxis is indicated for most patients (in the absence of a contraindication)

## 2020-01-07 NOTE — ED ADULT NURSE NOTE - IN THE PAST 12 MONTHS HAVE YOU USED DRUGS OTHER THAN THOSE REQUIRED FOR MEDICAL REASON?
No

## 2020-01-07 NOTE — ED ADULT NURSE NOTE - OBJECTIVE STATEMENT
Patient presents stating he received a phone call to return for IV antibiotics due to UTI, states he was in ED two days ago

## 2020-01-07 NOTE — ED STATDOCS - PMH
Chronic maxillary sinusitis    Diabetes mellitus  pre  Dyslipidemia    Essential hypertension    GERD (gastroesophageal reflux disease)    [place] (hard of hearing)  bilat hearing aides  Obstructive sleep apnea  h/o UPPP done 5/2005 pt states never retested & no longer required CPAP mechine  Overactive bladder    Umbilical hernia    Weight loss  60lbs

## 2020-01-07 NOTE — ED STATDOCS - PROGRESS NOTE DETAILS
60 y/o M presents with ESBL. pt here 2 days ago for urinary sx, sent home on bactrim, UC grew out ESBL resistant to bactrim. Called back to ED for IV abx. No other complaints at this time. -Ramirez Colbert PA-C Discussed case with Dr. Mcintosh who will admit pt. -Ramirez Colbert PA-C

## 2020-01-07 NOTE — ED ADULT TRIAGE NOTE - CHIEF COMPLAINT QUOTE
patient states he received a phone call telling him to return to hospital for IV antibiotics.  he denies fever/chills.  States he is feeling better.

## 2020-01-07 NOTE — H&P ADULT - NSHPPHYSICALEXAM_GEN_ALL_CORE
Vital Signs Last 24 Hrs  T(C): 36.9 (07 Jan 2020 14:35), Max: 36.9 (07 Jan 2020 14:35)  T(F): 98.4 (07 Jan 2020 14:35), Max: 98.4 (07 Jan 2020 14:35)  HR: 85 (07 Jan 2020 14:35) (79 - 85)  BP: 129/84 (07 Jan 2020 14:35) (129/84 - 143/82)  BP(mean): --  RR: 18 (07 Jan 2020 14:35) (16 - 18)  SpO2: 94% (07 Jan 2020 14:35) (92% - 94%)

## 2020-01-07 NOTE — H&P ADULT - NSICDXFAMILYHX_GEN_ALL_CORE_FT
FAMILY HISTORY:  Family history of aortic aneurysm  Family history of breast cancer  Family history of colon cancer  Family history of diabetes mellitus    Grandparent  Still living? No  Family history of aortic aneurysm, Age at diagnosis: Age Unknown

## 2020-01-07 NOTE — H&P ADULT - NSICDXPASTMEDICALHX_GEN_ALL_CORE_FT
PAST MEDICAL HISTORY:  Chronic maxillary sinusitis     Diabetes mellitus pre    Dyslipidemia     Essential hypertension     GERD (gastroesophageal reflux disease)     [place] (hard of hearing) bilat hearing aides    Obstructive sleep apnea h/o UPPP done 5/2005 pt states never retested & no longer required CPAP mechine    Overactive bladder     Umbilical hernia     Weight loss 60lbs

## 2020-01-07 NOTE — H&P ADULT - HISTORY OF PRESENT ILLNESS
58 yo male with PMH of DM2, HTN, HLD, BPH, h/o recurrent ear infections and sinus infections called back to ED for UTI. Pt states since New Years Day he was having urinary frequency which progressed to urgency and dysuria. On 1/4/20 he developed hematuria with clots. He came to the ED and had a CBI which cleared up the hematuria. Ct was performed which showed cystitis/prostatitis and he was discharged on PO bactrim. Urine Culture was performed and showed ESBL and was called back to the ED for IV abx. He had a fever of 101 on 1/4/20 but no further fevers. He states he is feeling well and no current dysuria. No further hematuria.

## 2020-01-07 NOTE — ED STATDOCS - ATTENDING CONTRIBUTION TO CARE
Pt with complaints of lower abdominal pain starting Friday. Pt reports the pain started after lifting something heavy at work. Pt with complaints of nausea but denies vomiting or diarrhea. I, Emily Ruiz MD,  performed the initial face to face bedside interview with this patient regarding history of present illness, review of symptoms and relevant past medical, social and family history.  I completed an independent physical examination.  I was the initial provider who evaluated this patient. I have signed out the follow up of any pending tests (i.e. labs, radiological studies) to the ACP.  I have communicated the patient’s plan of care and disposition with the ACP.  The history, relevant review of systems, past medical and surgical history, medical decision making, and physical examination was documented by the scribe in my presence and I attest to the accuracy of the documentation.

## 2020-01-07 NOTE — ED ADULT NURSE NOTE - ISOLATION TYPE:
Addended by: GOSIA CORDERO on: 5/29/2018 02:25 PM     Modules accepted: Orders    
Contact precautions...

## 2020-01-07 NOTE — ED STATDOCS - OBJECTIVE STATEMENT
60 y/o M with a PMHx of DM, HTN, sleep apnea presents to the ED stating that he received a phone call this morning telling him to return to the ED for IV abx. Pt was seen in ED 2 days ago for  symptoms and had a urine culture which resulted and showed ESBL. Denies fever, or chills. Pt with no complaints.

## 2020-01-08 ENCOUNTER — APPOINTMENT (OUTPATIENT)
Dept: SPEECH THERAPY | Facility: CLINIC | Age: 60
End: 2020-01-08

## 2020-01-08 ENCOUNTER — TRANSCRIPTION ENCOUNTER (OUTPATIENT)
Age: 60
End: 2020-01-08

## 2020-01-08 VITALS
OXYGEN SATURATION: 98 % | RESPIRATION RATE: 17 BRPM | SYSTOLIC BLOOD PRESSURE: 119 MMHG | DIASTOLIC BLOOD PRESSURE: 82 MMHG | HEART RATE: 84 BPM

## 2020-01-08 LAB
CULTURE RESULTS: SIGNIFICANT CHANGE UP
HBA1C BLD-MCNC: 6.8 % — HIGH (ref 4–5.6)
HCV AB S/CO SERPL IA: 0.1 S/CO — SIGNIFICANT CHANGE UP (ref 0–0.99)
HCV AB SERPL-IMP: SIGNIFICANT CHANGE UP
SPECIMEN SOURCE: SIGNIFICANT CHANGE UP

## 2020-01-08 RX ORDER — MELOXICAM 15 MG/1
1 TABLET ORAL
Qty: 0 | Refills: 0 | DISCHARGE

## 2020-01-08 RX ORDER — ERTAPENEM SODIUM 1 G/1
1 INJECTION, POWDER, LYOPHILIZED, FOR SOLUTION INTRAMUSCULAR; INTRAVENOUS
Qty: 0 | Refills: 0 | DISCHARGE
Start: 2020-01-08 | End: 2020-02-05

## 2020-01-08 RX ADMIN — ERTAPENEM SODIUM 120 MILLIGRAM(S): 1 INJECTION, POWDER, LYOPHILIZED, FOR SOLUTION INTRAMUSCULAR; INTRAVENOUS at 14:28

## 2020-01-08 RX ADMIN — Medication 81 MILLIGRAM(S): at 12:49

## 2020-01-08 RX ADMIN — CELECOXIB 200 MILLIGRAM(S): 200 CAPSULE ORAL at 12:49

## 2020-01-08 RX ADMIN — Medication 1 TABLET(S): at 12:50

## 2020-01-08 RX ADMIN — CELECOXIB 200 MILLIGRAM(S): 200 CAPSULE ORAL at 12:51

## 2020-01-08 RX ADMIN — PANTOPRAZOLE SODIUM 40 MILLIGRAM(S): 20 TABLET, DELAYED RELEASE ORAL at 11:54

## 2020-01-08 NOTE — ADVANCED PRACTICE NURSE CONSULT - ASSESSMENT
Pt awake and alert. Risks and benefits of midline catheter explained, verbal consent obtained. ARROW endurance extended dwell midline catheter, 20g, 8cm, lot # [id number] placed in left cephalic vein under ultrasound guidance and sterile precautions. Brisk blood return, flushes well with 10ml normal saline, OK to use.

## 2020-01-08 NOTE — DISCHARGE NOTE PROVIDER - NSDCCPCAREPLAN_GEN_ALL_CORE_FT
PRINCIPAL DISCHARGE DIAGNOSIS  Diagnosis: UTI (urinary tract infection)  Assessment and Plan of Treatment: -ESBL cystitis  -Continue IV antibioitcs for 4 weeks  -Check weekly CBC, CMP, CRP  -Follow up with PCP within 2 weeks of discharge

## 2020-01-08 NOTE — DISCHARGE NOTE PROVIDER - HOSPITAL COURSE
58 yo male with PMH of DM2, HTN, HLD, BPH, h/o recurrent ear infections and sinus infections called back to ED for UTI. Pt states since New Years Day he was having urinary frequency which progressed to urgency and dysuria. On 1/4/20 he developed hematuria with clots. He came to the ED and had a CBI which cleared up the hematuria. Ct was performed which showed cystitis/prostatitis and he was discharged on PO bactrim. Urine Culture was performed and showed ESBL and was called back to the ED for IV abx. He had a fever of 101 on 1/4/20 but no further fevers. He states he is feeling well and no current dysuria. No further hematuria.         1/8/20- Patient seen and examined at bedside. States he feels well, denies any CP, SOB, abd pain, dysuria, hematuria. States he wants to go home.        Physical Exam:    General: Well developed, well nourished, NAD    Neurology: A&Ox3, nonfocal     Respiratory: CTA B/L, No W/R/R    CV: RRR, +S1/S2,    Abdominal: Soft, NT, ND +BSx4    Extremities: No C/C/E, + peripheral pulses     Skin: warm, dry        #ESBL E. coli cystitis/prostatitis    - IV abx - Invanz    - ID consult - Dr. Montoya    - plan for midline and outpatient IV abx infusion, pt in agreement with plan        #DM2    - hold metformin    - ISS        #HTN/HLD    - continue home meds        Total time spent on discharge including coordination of care: 33 minutes

## 2020-01-08 NOTE — CONSULT NOTE ADULT - SUBJECTIVE AND OBJECTIVE BOX
Patient is a 59y old  Male who presents with a chief complaint of ESBL     HPI:  58 y/o male with h/o DM2, HTN, HLD, BPH, h/o recurrent ear infections and sinus infections was admitted on  after he was reported with ESBL organism in urine culture. Pt states since New Years Day he was having urinary frequency which progressed to urgency and dysuria. On 20 he developed hematuria with clots. He came to the ED and had a CBI which cleared up the hematuria. CT was performed which showed cystitis/prostatitis and he was discharged on PO bactrim. Urine Culture was performed and showed ESBL and was called back to the ED for IV abx. He had a fever of 101 on 20 but no further fevers. He states he is feeling well and no current dysuria. No further hematuria. In ER he received ertapenem.       PMH: as above  PSH: as above  Meds: per reconciliation sheet, noted below  MEDICATIONS  (STANDING):  aspirin enteric coated 81 milliGRAM(s) Oral daily  atorvastatin 80 milliGRAM(s) Oral at bedtime  celecoxib 200 milliGRAM(s) Oral daily  dextrose 5%. 1000 milliLiter(s) (50 mL/Hr) IV Continuous <Continuous>  dextrose 50% Injectable 12.5 Gram(s) IV Push once  dextrose 50% Injectable 25 Gram(s) IV Push once  dextrose 50% Injectable 25 Gram(s) IV Push once  ertapenem  IVPB 1000 milliGRAM(s) IV Intermittent every 24 hours  insulin lispro (HumaLOG) corrective regimen sliding scale   SubCutaneous three times a day before meals  insulin lispro (HumaLOG) corrective regimen sliding scale   SubCutaneous at bedtime  losartan 100 milliGRAM(s) Oral at bedtime  montelukast 10 milliGRAM(s) Oral at bedtime  multivitamin 1 Tablet(s) Oral daily  pantoprazole    Tablet 40 milliGRAM(s) Oral before breakfast  tamsulosin 0.4 milliGRAM(s) Oral at bedtime  testosterone 1% Gel 25 milliGRAM(s) Topical daily    MEDICATIONS  (PRN):  dextrose 40% Gel 15 Gram(s) Oral once PRN Blood Glucose LESS THAN 70 milliGRAM(s)/deciliter  glucagon  Injectable 1 milliGRAM(s) IntraMuscular once PRN Glucose LESS THAN 70 milligrams/deciliter    Allergies    No Known Allergies    Intolerances      Social: no smoking, no alcohol, no illegal drugs; no recent travel, no exposure to TB  FAMILY HISTORY:  Family history of aortic aneurysm (Grandparent)  Family history of aortic aneurysm  Family history of breast cancer  Family history of colon cancer  Family history of diabetes mellitus    no history of premature cardiovascular disease in first degree relatives    ROS: the patient denies HA, no seizures, no dizziness, no sore throat, no nasal congestion, no blurry vision, no CP, no palpitations, no SOB, no cough, no abdominal pain, no diarrhea, no N/V, has dysuria, no leg pain, no claudication, no rash, no joint aches, no rectal pain or bleeding, no night sweats  All other systems reviewed and are negative    Vital Signs Last 24 Hrs  T(C): 36.3 (2020 04:00), Max: 36.9 (2020 14:35)  T(F): 97.4 (2020 04:00), Max: 98.4 (2020 14:35)  HR: 75 (2020 04:00) (72 - 85)  BP: 116/74 (2020 04:00) (116/65 - 143/82)  BP(mean): --  RR: 18 (2020 04:00) (16 - 20)  SpO2: 95% (2020 04:00) (92% - 100%)  Daily Height in cm: 175.26 (2020 11:44)    Daily     PE:    Constitutional: frail looking  HEENT: NC/AT, EOMI, PERRLA, conjunctivae clear; ears and nose atraumatic; pharynx benign  Neck: supple; thyroid not palpable  Back: no tenderness  Respiratory: respiratory effort normal; clear to auscultation  Cardiovascular: S1S2 regular, no murmurs  Abdomen: soft, not tender, not distended, positive BS; no liver or spleen organomegaly  Genitourinary: no suprapubic tenderness  Lymphatic: no LN palpable  Musculoskeletal: no muscle tenderness, no joint swelling or tenderness  Extremities: no pedal edema  Neurological/ Psychiatric: AxOx3, judgement and insight normal; moving all extremities  Skin: no rashes; no palpable lesions    Labs: all available labs reviewed                        16.3   4.60  )-----------( 214      ( 2020 12:28 )             49.0     01-07    139  |  105  |  17  ----------------------------<  134<H>  5.3   |  28  |  0.77    Ca    10.0      2020 12:28    TPro  7.6  /  Alb  3.9  /  TBili  0.9  /  DBili  x   /  AST  31  /  ALT  48  /  AlkPhos  73  01-07     LIVER FUNCTIONS - ( 2020 12:28 )  Alb: 3.9 g/dL / Pro: 7.6 gm/dL / ALK PHOS: 73 U/L / ALT: 48 U/L / AST: 31 U/L / GGT: x           Urinalysis Basic - ( 2020 13:03 )    Color: Yellow / Appearance: Clear / S.020 / pH: x  Gluc: x / Ketone: Negative  / Bili: Negative / Urobili: 1 mg/dL   Blood: x / Protein: Negative mg/dL / Nitrite: Negative   Leuk Esterase: Trace / RBC: 11-25 /HPF / WBC 11-25   Sq Epi: x / Non Sq Epi: Occasional / Bacteria: Few      Culture - Blood (collected 2020 15:29)  Source: .Blood Blood  Preliminary Report (2020 02:03):    No growth to date.    Culture - Urine (collected 2020 15:29)  Source: .Urine None  Final Report (2020 16:50):    >100,000 CFU/ml Escherichia coli ESBL  Organism: Escherichia coli ESBL (2020 16:50)  Organism: Escherichia coli ESBL (2020 16:50)      -  Amikacin: S <=16      -  Ampicillin: R >16 These ampicillin results predict results for amoxicillin      -  Ampicillin/Sulbactam: R 16/8 Enterobacter, Citrobacter, and Serratia may develop resistance during prolonged therapy (3-4 days)      -  Aztreonam: R >16      -  Cefazolin: R >16 (MIC_CL_COM_ENTERIC_CEFAZU) For uncomplicated UTI with K. pneumoniae, E. coli, or P. mirablis: GRACE <=16 is sensitive and GRACE >=32 is resistant. This also predicts results for oral agents cefaclor, cefdinir, cefpodoxime, cefprozil, cefuroxime axetil, cephalexin and locarbef for uncomplicated UTI. Note that some isolates may be susceptible to these agents while testing resistant to cefazolin.      -  Cefepime: R >16      -  Cefoxitin: S <=8      -  Ceftriaxone: R >32 Enterobacter, Citrobacter, and Serratia may develop resistance during prolonged therapy      -  Ciprofloxacin: R >2      -  Ertapenem: S <=1      -  Gentamicin: R >8      -  Imipenem: S <=1      -  Levofloxacin: R >4      -  Meropenem: S <=1      -  Nitrofurantoin: S <=32 Should not be used to treat pyelonephritis      -  Piperacillin/Tazobactam: R <=16      -  Tigecycline: S <=2      -  Tobramycin: R >8      -  Trimethoprim/Sulfamethoxazole: R >2/38      Method Type: GRACE    Radiology: all available radiological tests reviewed    < from: CT Abdomen and Pelvis No Cont (20 @ 17:19) >  Possible cystitis/prostatitis.    < end of copied text >      Advanced directives addressed: full resuscitation

## 2020-01-08 NOTE — DISCHARGE NOTE PROVIDER - NSDCFUSCHEDAPPT_GEN_ALL_CORE_FT
GISSEL LUCAS ; 01/08/2020 ; [facility]  [facility]  GISSEL LUCAS ; 02/19/2020 ; [facility]  [place] GISSEL LUCAS ; 01/08/2020 ; [facility]  [facility]  GISSEL LUCAS ; 02/19/2020 ; [facility]  [place] [facility]

## 2020-01-08 NOTE — CONSULT NOTE ADULT - ASSESSMENT
58 y/o male with h/o DM2, HTN, HLD, BPH, h/o recurrent ear infections and sinus infections was admitted on 1/7 after he was reported with ESBL organism in urine culture. Pt states since New Years Day he was having urinary frequency which progressed to urgency and dysuria. On 1/4/20 he developed hematuria with clots. He came to the ED and had a CBI which cleared up the hematuria. CT was performed which showed cystitis/prostatitis and he was discharged on PO bactrim. Urine Culture was performed and showed ESBL and was called back to the ED for IV abx. He had a fever of 101 on 1/4/20 but no further fevers. He states he is feeling well and no current dysuria. No further hematuria. In ER he received ertapenem.     1. Febrile syndrome. UTI with ESBL E.coli. Acute prostatitis and cystitis. BPH.   -urine culture reviewed  -CT abdomen and pelvis reviewed  -start ertapenem 1 gm IV qd  -reason for abx use and side effects reviewed with patient; monitor BMP   -urology evaluation  -midline  -he will need longer IV abx therapy  -home IV abx setup in progress; case reviewed with case management; orders reviewed and called in to pharmacist with infusion company; awaiting insurance approval  -weekly labs  -old chart reviewed to assess prior cultures  -monitor temps  -f/u CBC  -supportive care  2. Other issues:   -care per medicine

## 2020-01-08 NOTE — DISCHARGE NOTE PROVIDER - CARE PROVIDER_API CALL
Jhon Ortiz)  Internal Medicine  [address] [place] [id number]  Phone: [phone]  Fax: [phone]  Follow Up Time: 2 weeks

## 2020-01-08 NOTE — DISCHARGE NOTE NURSING/CASE MANAGEMENT/SOCIAL WORK - PATIENT PORTAL LINK FT
You can access the FollowMyHealth Patient Portal offered by [facility] by registering at the following website: [email or web address]. By joining [employer]’s FollowMyHealth portal, you will also be able to view your health information using other applications (apps) compatible with our system.

## 2020-01-10 LAB
CULTURE RESULTS: SIGNIFICANT CHANGE UP
SPECIMEN SOURCE: SIGNIFICANT CHANGE UP

## 2020-01-13 ENCOUNTER — OUTPATIENT (OUTPATIENT)
Dept: OUTPATIENT SERVICES | Facility: HOSPITAL | Age: 60
LOS: 1 days | Discharge: HOME | End: 2020-01-13

## 2020-01-13 ENCOUNTER — APPOINTMENT (OUTPATIENT)
Dept: SPEECH THERAPY | Facility: CLINIC | Age: 60
End: 2020-01-13

## 2020-01-13 DIAGNOSIS — Z98.89 OTHER SPECIFIED POSTPROCEDURAL STATES: Chronic | ICD-10-CM

## 2020-01-13 DIAGNOSIS — I10 ESSENTIAL (PRIMARY) HYPERTENSION: ICD-10-CM

## 2020-01-13 DIAGNOSIS — Z79.84 LONG TERM (CURRENT) USE OF ORAL HYPOGLYCEMIC DRUGS: ICD-10-CM

## 2020-01-13 DIAGNOSIS — D48.9 NEOPLASM OF UNCERTAIN BEHAVIOR, UNSPECIFIED: Chronic | ICD-10-CM

## 2020-01-13 DIAGNOSIS — N40.0 BENIGN PROSTATIC HYPERPLASIA WITHOUT LOWER URINARY TRACT SYMPTOMS: ICD-10-CM

## 2020-01-13 DIAGNOSIS — Z16.12 EXTENDED SPECTRUM BETA LACTAMASE (ESBL) RESISTANCE: ICD-10-CM

## 2020-01-13 DIAGNOSIS — Z90.49 ACQUIRED ABSENCE OF OTHER SPECIFIED PARTS OF DIGESTIVE TRACT: ICD-10-CM

## 2020-01-13 DIAGNOSIS — L72.9 FOLLICULAR CYST OF THE SKIN AND SUBCUTANEOUS TISSUE, UNSPECIFIED: Chronic | ICD-10-CM

## 2020-01-13 DIAGNOSIS — N30.00 ACUTE CYSTITIS WITHOUT HEMATURIA: ICD-10-CM

## 2020-01-13 DIAGNOSIS — E78.5 HYPERLIPIDEMIA, UNSPECIFIED: ICD-10-CM

## 2020-01-13 DIAGNOSIS — N41.0 ACUTE PROSTATITIS: ICD-10-CM

## 2020-01-13 DIAGNOSIS — J34.2 DEVIATED NASAL SEPTUM: Chronic | ICD-10-CM

## 2020-01-13 DIAGNOSIS — Z87.898 PERSONAL HISTORY OF OTHER SPECIFIED CONDITIONS: Chronic | ICD-10-CM

## 2020-01-13 DIAGNOSIS — E11.9 TYPE 2 DIABETES MELLITUS WITHOUT COMPLICATIONS: ICD-10-CM

## 2020-01-13 DIAGNOSIS — B96.20 UNSPECIFIED ESCHERICHIA COLI [E. COLI] AS THE CAUSE OF DISEASES CLASSIFIED ELSEWHERE: ICD-10-CM

## 2020-01-13 DIAGNOSIS — H26.9 UNSPECIFIED CATARACT: Chronic | ICD-10-CM

## 2020-01-16 LAB
MRSA SPEC QL CULT: NOT DETECTED
STAPH AUREUS (SA): NOT DETECTED

## 2020-01-21 DIAGNOSIS — H90.5 UNSPECIFIED SENSORINEURAL HEARING LOSS: ICD-10-CM

## 2020-02-24 ENCOUNTER — OUTPATIENT (OUTPATIENT)
Dept: OUTPATIENT SERVICES | Facility: HOSPITAL | Age: 60
LOS: 1 days | End: 2020-02-24
Payer: COMMERCIAL

## 2020-02-24 VITALS
OXYGEN SATURATION: 97 % | WEIGHT: 201.06 LBS | HEART RATE: 74 BPM | SYSTOLIC BLOOD PRESSURE: 126 MMHG | HEIGHT: 69 IN | DIASTOLIC BLOOD PRESSURE: 75 MMHG | TEMPERATURE: 98 F | RESPIRATION RATE: 16 BRPM

## 2020-02-24 DIAGNOSIS — Z98.890 OTHER SPECIFIED POSTPROCEDURAL STATES: Chronic | ICD-10-CM

## 2020-02-24 DIAGNOSIS — J34.2 DEVIATED NASAL SEPTUM: Chronic | ICD-10-CM

## 2020-02-24 DIAGNOSIS — Z87.898 PERSONAL HISTORY OF OTHER SPECIFIED CONDITIONS: Chronic | ICD-10-CM

## 2020-02-24 DIAGNOSIS — H26.9 UNSPECIFIED CATARACT: Chronic | ICD-10-CM

## 2020-02-24 DIAGNOSIS — Z98.89 OTHER SPECIFIED POSTPROCEDURAL STATES: Chronic | ICD-10-CM

## 2020-02-24 DIAGNOSIS — K43.2 INCISIONAL HERNIA WITHOUT OBSTRUCTION OR GANGRENE: ICD-10-CM

## 2020-02-24 DIAGNOSIS — Z01.818 ENCOUNTER FOR OTHER PREPROCEDURAL EXAMINATION: ICD-10-CM

## 2020-02-24 DIAGNOSIS — D48.9 NEOPLASM OF UNCERTAIN BEHAVIOR, UNSPECIFIED: Chronic | ICD-10-CM

## 2020-02-24 DIAGNOSIS — Z98.49 CATARACT EXTRACTION STATUS, UNSPECIFIED EYE: Chronic | ICD-10-CM

## 2020-02-24 DIAGNOSIS — L72.9 FOLLICULAR CYST OF THE SKIN AND SUBCUTANEOUS TISSUE, UNSPECIFIED: Chronic | ICD-10-CM

## 2020-02-24 LAB
ANION GAP SERPL CALC-SCNC: 13 MMOL/L — SIGNIFICANT CHANGE UP (ref 5–17)
BUN SERPL-MCNC: 17 MG/DL — SIGNIFICANT CHANGE UP (ref 7–23)
CALCIUM SERPL-MCNC: 10 MG/DL — SIGNIFICANT CHANGE UP (ref 8.4–10.5)
CHLORIDE SERPL-SCNC: 103 MMOL/L — SIGNIFICANT CHANGE UP (ref 96–108)
CO2 SERPL-SCNC: 25 MMOL/L — SIGNIFICANT CHANGE UP (ref 22–31)
CREAT SERPL-MCNC: 0.49 MG/DL — LOW (ref 0.5–1.3)
GLUCOSE SERPL-MCNC: 124 MG/DL — HIGH (ref 70–99)
HCT VFR BLD CALC: 46.9 % — SIGNIFICANT CHANGE UP (ref 39–50)
HGB BLD-MCNC: 15.2 G/DL — SIGNIFICANT CHANGE UP (ref 13–17)
MCHC RBC-ENTMCNC: 28.5 PG — SIGNIFICANT CHANGE UP (ref 27–34)
MCHC RBC-ENTMCNC: 32.4 GM/DL — SIGNIFICANT CHANGE UP (ref 32–36)
MCV RBC AUTO: 87.8 FL — SIGNIFICANT CHANGE UP (ref 80–100)
MRSA PCR RESULT.: SIGNIFICANT CHANGE UP
NRBC # BLD: 0 /100 WBCS — SIGNIFICANT CHANGE UP (ref 0–0)
PLATELET # BLD AUTO: 164 K/UL — SIGNIFICANT CHANGE UP (ref 150–400)
POTASSIUM SERPL-MCNC: 4.3 MMOL/L — SIGNIFICANT CHANGE UP (ref 3.5–5.3)
POTASSIUM SERPL-SCNC: 4.3 MMOL/L — SIGNIFICANT CHANGE UP (ref 3.5–5.3)
RBC # BLD: 5.34 M/UL — SIGNIFICANT CHANGE UP (ref 4.2–5.8)
RBC # FLD: 14 % — SIGNIFICANT CHANGE UP (ref 10.3–14.5)
S AUREUS DNA NOSE QL NAA+PROBE: SIGNIFICANT CHANGE UP
SODIUM SERPL-SCNC: 141 MMOL/L — SIGNIFICANT CHANGE UP (ref 135–145)
WBC # BLD: 4.84 K/UL — SIGNIFICANT CHANGE UP (ref 3.8–10.5)
WBC # FLD AUTO: 4.84 K/UL — SIGNIFICANT CHANGE UP (ref 3.8–10.5)

## 2020-02-24 PROCEDURE — 87640 STAPH A DNA AMP PROBE: CPT

## 2020-02-24 PROCEDURE — 87641 MR-STAPH DNA AMP PROBE: CPT

## 2020-02-24 PROCEDURE — 80048 BASIC METABOLIC PNL TOTAL CA: CPT

## 2020-02-24 PROCEDURE — 85027 COMPLETE CBC AUTOMATED: CPT

## 2020-02-24 PROCEDURE — G0463: CPT

## 2020-02-24 RX ORDER — METFORMIN HYDROCHLORIDE 850 MG/1
3 TABLET ORAL
Qty: 0 | Refills: 0 | DISCHARGE

## 2020-02-24 RX ORDER — LIDOCAINE HCL 20 MG/ML
0.2 VIAL (ML) INJECTION ONCE
Refills: 0 | Status: DISCONTINUED | OUTPATIENT
Start: 2020-03-06 | End: 2020-03-21

## 2020-02-24 RX ORDER — METFORMIN HYDROCHLORIDE 850 MG/1
1 TABLET ORAL
Qty: 0 | Refills: 0 | DISCHARGE

## 2020-02-24 RX ORDER — SODIUM CHLORIDE 9 MG/ML
3 INJECTION INTRAMUSCULAR; INTRAVENOUS; SUBCUTANEOUS EVERY 8 HOURS
Refills: 0 | Status: DISCONTINUED | OUTPATIENT
Start: 2020-03-06 | End: 2020-03-21

## 2020-02-24 NOTE — H&P PST ADULT - NSICDXPASTSURGICALHX_GEN_ALL_CORE_FT
PAST SURGICAL HISTORY:  Deviated septum nasal cyst 10/2015    Giant cell tumor excision left tibia, aspiration intalesional curettage, cementation, insertion of Nydia pins,  12/2004 - benign    H/O arthroscopy of right knee meniscus injury 2002    H/O cataract removal with insertion of prosthetic lens b/l, 11/2013    H/O excision of mass - posterior neck cyst, 10/2019    H/O lipoma eexcision from chest & back 4/2011, 2012 - chest & axilia    History of cholecystectomy 2003    History of tonsillectomy 1968    S/P UPPP (uvulopalatopharyngoplasty) h/o 2005    Scrotal cyst 12/2011 PAST SURGICAL HISTORY:  Deviated septum nasal cyst 10/2015    Giant cell tumor excision left tibia, aspiration intralesional curettage, cementation, insertion of Nydia pins,  12/2004 - benign    H/O arthroscopy of right knee meniscus injury 2002    H/O cataract removal with insertion of prosthetic lens b/l, 11/2013    H/O excision of mass - posterior neck cyst, 10/2019    H/O lipoma eexcision from chest & back 4/2011, 2012 - chest & axilia    History of cholecystectomy 2003    History of tonsillectomy 1968    S/P UPPP (uvulopalatopharyngoplasty) h/o 2005    Scrotal cyst 12/2011

## 2020-02-24 NOTE — H&P PST ADULT - HISTORY OF PRESENT ILLNESS
59 y.o. male with h/o HTN, DM2 c/o periumbilical discomfort, mild pain with activity for the past 3 years due to a small hernia. He is scheduled for Incisional Hernia Repair with Tap Block Anesthesia.

## 2020-02-24 NOTE — H&P PST ADULT - NSICDXPASTMEDICALHX_GEN_ALL_CORE_FT
PAST MEDICAL HISTORY:  Bone, giant cell tumor Left proximal tibia, 2004 - removed, benign    Chronic maxillary sinusitis     Cystitis 01/04/20    Diabetes mellitus Type 2    Dyslipidemia     Essential hypertension     GERD (gastroesophageal reflux disease)     [place]hard of hearing[place] bilateral hearing aides    Incisional hernia     Obstructive sleep apnea h/o UPPP done 5/2005 pt states never retested & no longer required CPAP mechine    Overactive bladder     Umbilical hernia     Weight loss 60lbs PAST MEDICAL HISTORY:  Bone, giant cell tumor Left proximal tibia, 2004 - removed, benign    BPH (benign prostatic hyperplasia)     Chronic maxillary sinusitis     Cystitis 01/04/20    Diabetes mellitus Type 2    Dyslipidemia     Essential hypertension     GERD (gastroesophageal reflux disease)     [place] of hearing[place] bilateral hearing aides    Incisional hernia     Obstructive sleep apnea h/o UPPP done 5/2005 pt states never retested & no longer required CPAP mechine    Overactive bladder     Umbilical hernia     Weight loss 60lbs - with diet and exersize PAST MEDICAL HISTORY:  Bone, giant cell tumor Left proximal tibia, 2004 - removed, benign    BPH (benign prostatic hyperplasia)     Chronic maxillary sinusitis     Cystitis 01/04/20    Diabetes mellitus Type 2    Dyslipidemia     Essential hypertension     GERD (gastroesophageal reflux disease)     [place] (hard of hearing) bilateral hearing aides    Incisional hernia     Obstructive sleep apnea h/o UPPP done 5/2005 pt states never retested & no longer required CPAP mechine    Overactive bladder     Umbilical hernia     Weight loss 60lbs - with diet and exersize

## 2020-02-24 NOTE — H&P PST ADULT - NSANTHOSAYNRD_GEN_A_CORE
s/p UPPP/No. RYAN screening performed.  STOP BANG Legend: 0-2 = LOW Risk; 3-4 = INTERMEDIATE Risk; 5-8 = HIGH Risk

## 2020-02-24 NOTE — H&P PST ADULT - NEGATIVE GENERAL GENITOURINARY SYMPTOMS
no hematuria/no renal colic/no dysuria/normal urinary frequency/h/o UTI  - diagnosed 01/04/20, treated with IV abx for 30 days, resolved/no urinary hesitancy/no urine discoloration/no incontinence/no flank pain L

## 2020-03-05 ENCOUNTER — TRANSCRIPTION ENCOUNTER (OUTPATIENT)
Age: 60
End: 2020-03-05

## 2020-03-06 ENCOUNTER — APPOINTMENT (OUTPATIENT)
Dept: SURGERY | Facility: HOSPITAL | Age: 60
End: 2020-03-06
Payer: COMMERCIAL

## 2020-03-06 ENCOUNTER — OUTPATIENT (OUTPATIENT)
Dept: OUTPATIENT SERVICES | Facility: HOSPITAL | Age: 60
LOS: 1 days | Discharge: HOME | End: 2020-03-06
Payer: COMMERCIAL

## 2020-03-06 VITALS
SYSTOLIC BLOOD PRESSURE: 131 MMHG | OXYGEN SATURATION: 97 % | HEIGHT: 69 IN | DIASTOLIC BLOOD PRESSURE: 81 MMHG | TEMPERATURE: 98 F | RESPIRATION RATE: 16 BRPM | HEART RATE: 63 BPM | WEIGHT: 201.06 LBS

## 2020-03-06 VITALS
OXYGEN SATURATION: 98 % | RESPIRATION RATE: 18 BRPM | DIASTOLIC BLOOD PRESSURE: 65 MMHG | SYSTOLIC BLOOD PRESSURE: 114 MMHG | HEART RATE: 70 BPM

## 2020-03-06 DIAGNOSIS — Z98.49 CATARACT EXTRACTION STATUS, UNSPECIFIED EYE: Chronic | ICD-10-CM

## 2020-03-06 DIAGNOSIS — D48.9 NEOPLASM OF UNCERTAIN BEHAVIOR, UNSPECIFIED: Chronic | ICD-10-CM

## 2020-03-06 DIAGNOSIS — Z98.89 OTHER SPECIFIED POSTPROCEDURAL STATES: Chronic | ICD-10-CM

## 2020-03-06 DIAGNOSIS — Z98.890 OTHER SPECIFIED POSTPROCEDURAL STATES: Chronic | ICD-10-CM

## 2020-03-06 DIAGNOSIS — L72.9 FOLLICULAR CYST OF THE SKIN AND SUBCUTANEOUS TISSUE, UNSPECIFIED: Chronic | ICD-10-CM

## 2020-03-06 DIAGNOSIS — Z87.898 PERSONAL HISTORY OF OTHER SPECIFIED CONDITIONS: Chronic | ICD-10-CM

## 2020-03-06 DIAGNOSIS — K43.2 INCISIONAL HERNIA WITHOUT OBSTRUCTION OR GANGRENE: ICD-10-CM

## 2020-03-06 DIAGNOSIS — J34.2 DEVIATED NASAL SEPTUM: Chronic | ICD-10-CM

## 2020-03-06 LAB — GLUCOSE BLDC GLUCOMTR-MCNC: 114 MG/DL — HIGH (ref 70–99)

## 2020-03-06 PROCEDURE — 82962 GLUCOSE BLOOD TEST: CPT

## 2020-03-06 PROCEDURE — 49568: CPT

## 2020-03-06 PROCEDURE — 49560: CPT

## 2020-03-06 PROCEDURE — 49561: CPT

## 2020-03-06 PROCEDURE — C1781: CPT

## 2020-03-06 RX ORDER — ONDANSETRON 8 MG/1
4 TABLET, FILM COATED ORAL ONCE
Refills: 0 | Status: DISCONTINUED | OUTPATIENT
Start: 2020-03-06 | End: 2020-03-21

## 2020-03-06 RX ORDER — SODIUM CHLORIDE 9 MG/ML
1000 INJECTION, SOLUTION INTRAVENOUS
Refills: 0 | Status: DISCONTINUED | OUTPATIENT
Start: 2020-03-06 | End: 2020-03-21

## 2020-03-06 RX ORDER — HYDROMORPHONE HYDROCHLORIDE 2 MG/ML
0.5 INJECTION INTRAMUSCULAR; INTRAVENOUS; SUBCUTANEOUS ONCE
Refills: 0 | Status: DISCONTINUED | OUTPATIENT
Start: 2020-03-06 | End: 2020-03-06

## 2020-03-06 RX ORDER — CHLORHEXIDINE GLUCONATE 213 G/1000ML
1 SOLUTION TOPICAL ONCE
Refills: 0 | Status: COMPLETED | OUTPATIENT
Start: 2020-03-06 | End: 2020-03-06

## 2020-03-06 RX ORDER — CEFAZOLIN SODIUM 1 G
2000 VIAL (EA) INJECTION ONCE
Refills: 0 | Status: COMPLETED | OUTPATIENT
Start: 2020-03-06 | End: 2020-03-06

## 2020-03-06 RX ORDER — CELECOXIB 200 MG/1
200 CAPSULE ORAL ONCE
Refills: 0 | Status: COMPLETED | OUTPATIENT
Start: 2020-03-06 | End: 2020-03-06

## 2020-03-06 RX ORDER — OXYCODONE HYDROCHLORIDE 5 MG/1
5 TABLET ORAL ONCE
Refills: 0 | Status: DISCONTINUED | OUTPATIENT
Start: 2020-03-06 | End: 2020-03-06

## 2020-03-06 RX ORDER — ACETAMINOPHEN 500 MG
1000 TABLET ORAL ONCE
Refills: 0 | Status: COMPLETED | OUTPATIENT
Start: 2020-03-06 | End: 2020-03-06

## 2020-03-06 RX ADMIN — Medication 1000 MILLIGRAM(S): at 12:43

## 2020-03-06 RX ADMIN — CELECOXIB 200 MILLIGRAM(S): 200 CAPSULE ORAL at 12:43

## 2020-03-06 RX ADMIN — CHLORHEXIDINE GLUCONATE 1 APPLICATION(S): 213 SOLUTION TOPICAL at 12:45

## 2020-03-06 NOTE — ASU PATIENT PROFILE, ADULT - BRADEN SCORE (IF 18 OR LESS ACTIVATE SKIN INJURY RISK INCREASED GUIDELINE), MLM
Acute Care - Occupational Therapy Initial Evaluation  [facility]     Patient Name: Mickey Lind  : 1956  MRN: [id number]  Today's Date: 2017  Onset of Illness/Injury or Date of Surgery Date: 17  Date of Referral to OT: 17  Referring Physician: Dr. Bambi Ríos    Admit Date: 2017       ICD-10-CM ICD-9-CM   1. Intractable vomiting with nausea, unspecified vomiting type R11.2 536.2   2. Chronic pain due to neoplasm G89.3 338.3   3. Tobacco dependence F17.200 305.1   4. Hypokalemia E87.6 276.8   5. Hypomagnesemia E83.42 275.2   6. Abnormality of gait and mobility R26.9 781.2   7. Muscle weakness (generalized) M62.81 728.87   8. Impaired mobility and ADLs Z74.09 799.89     Patient Active Problem List   Diagnosis   • End stage liver disease   • Depression   • Low back pain with sciatica   • Hyperlipidemia   • Unilateral inguinal hernia without obstruction or gangrene   • Diarrhea   • Hepatic cirrhosis   • Epigastric pain   • Peripheral vascular disease   • Encounter for venous access device care   • Multiple myeloma, stage 3   • Anemia   • Chronic hepatitis C without hepatic coma   • Lower abdominal pain   • Ascites due to alcoholic cirrhosis   • Tobacco dependence   • Simple chronic bronchitis   • Pleural effusion   • Ulcer of sacral region, stage 1   • CAP (community acquired pneumonia), bilateral   • Does not have health insurance   • Acute respiratory failure with hypoxia   • Colitis   • Infectious colitis   • Hyponatremia   • Does not have health insurance   • Nausea   • Hypokalemia   • Intractable nausea and vomiting   • Cachexia   • Compression fracture of twelfth thoracic vertebra   • Functional diarrhea   • Dysuria   • Periumbilical abdominal pain   • History of cirrhosis     Past Medical History:   Diagnosis Date   • Abdominal pain    • Alcoholic liver damage    • Alcoholic polyneuropathy    • Amblyopia     refractive os   • Anemia    • Ascites    • Ascites    • Asthma     • Astigmatism    • Cellulitis of left lower leg    • Chronic hepatitis C    • Chronic viral hepatitis C    • Cirrhosis of liver    • Claudication    • Encounter for immunization    • Generalized edema    • History of blood transfusion    • History of colon polyps    • Hypermetropia    • Inguinal hernia     - Rih      • Low back pain    • Major depressive disorder     recurrent , moderate   • Multiple myeloma    • Myocardial infarction    • Pain in joint, lower leg    • Peripheral vascular disease    • Recurrent major depressive episodes    • Tinea unguium    • Tobacco use      Past Surgical History:   Procedure Laterality Date   • CARDIAC CATHETERIZATION      (Successful PCI to a totally occluded RCA with a 2.5 x 28 and a 2.5 x 8 mm Xience stent. Nonobstructive disease in the left coronary artery system.)   11/24/2012    • CARDIAC SURGERY     • COLONOSCOPY  10/13/2016   • COLONOSCOPY N/A 3/29/2017    Procedure: COLONOSCOPY;  Surgeon: Jose Daniel David MD;  Location: [facility] ENDOSCOPY;  Service:    • ENDOSCOPY  10/13/2016   • ENDOSCOPY N/A 3/29/2017    Procedure: ESOPHAGOGASTRODUODENOSCOPY (U/S FIRST @0945) ;  Surgeon: Jose Daniel David MD;  Location: [facility] ENDOSCOPY;  Service:    • UPPER GASTROINTESTINAL ENDOSCOPY  10/13/2016          OT ASSESSMENT FLOWSHEET (last 72 hours)      OT Evaluation       08/09/17 0935 08/09/17 0900 08/08/17 1709          Rehab Evaluation    Document Type evaluation  - evaluation  -       Subjective Information agree to therapy;complains of;pain  - agree to therapy;complains of;pain  -LM       Patient Effort, Rehab Treatment adequate  - adequate  -       Symptoms Noted During/After Treatment fatigue  - fatigue  -       General Information    Patient Profile Review yes  - yes  -LM       Onset of Illness/Injury or Date of Surgery Date 08/08/17  - 08/08/17  -       Referring Physician Dr. Bambi Ríos  - Dr. Bambi Ríos  -       General Observations Pt lying  in bed with HOB elevated. Noted to have IV, telemetryl.   - Pt lying in bed with HOB elevated.  Noted to have IV and telemetry.  Pt states he doesn't think he can do much but agreeable to PT eval.  -       Pertinent History Of Current Problem Admitted with nausea, vomitng, and diarrhea. Pt found to have hypokalemia also noted to have stage 3 multiple myeloma. Pt states he is very tired but agrees to OT.   - Admitted with nausea, vomiting, and diarrhea.  Pt found to have hypokalemia - also noted to have Stage 3 Multiple Myeloma.  Pt states he is very tired but agreeable to PT eval.  -       Precautions/Limitations fall precautions  - fall precautions  -       Prior Level of Function independent:;all household mobility;gait;ADL's   family does IADL  - independent:;all household mobility;gait;ADL's   Family completes IADL's  -       Equipment Currently Used at Home cane, straight;walker, rolling   uses SC w/ambulation  - cane, straight;walker, rolling   Uses SC with amb;   - cane, straight;walker, rolling  -      Plans/Goals Discussed With patient  - patient;agreed upon  -       Risks Reviewed patient:  - patient:;LOB;increased discomfort  -       Benefits Reviewed patient:  - patient:;improve function;increase independence;increase strength;increase balance  -       Barriers to Rehab medically complex  - medically complex  -       Living Environment    Lives With alone  - alone  - alone;other (see comments)   pt states family comes in and our almost 24/7  -      Living Arrangements [place]  - house  -[facility]  -      Home Accessibility stairs to enter home;bed and bath on same level;tub/shower is not walk in  - stairs to enter home;bed and bath on same level;tub/shower is not walk in  - no concerns  -      Number of Stairs to Enter Home 4  - 4  -LM       Stair Railings at Home none  - none  - none  -      Type of Financial/Environmental Concern   none  -       Transportation Available car;family or friend will provide  - car;family or friend will provide  - none  -      Living Environment Comment Son or dtr in law help in and out of steps. Pt family is in and out frequently sometimes pt home alone.  - Pt states he has family that are in/out of the house pretty much 24 hours a day.  -       Clinical Impression    Date of Referral to OT 08/08/17  -        OT Diagnosis impaired mobility and ADL  -        Prognosis fair  -        Functional Level At Time Of Evaluation Pt fatigued quickly. Pt did get upset with needing to use the restroom and did not want OT to assist or use gait belt but later apologized and agreed to let OT come back. pt unsteady with mobility and sit to stand with decreased endurance for ADL.   -        Impairments Found (describe specific impairments) aerobic capacity/endurance;joint integrity and mobility;motor function;muscle performance;ventilation and respiration/gas exchange   ADL, functional t/f and mobility  -        Patient/Family Goals Statement to go home  -        Criteria for Skilled Therapeutic Interventions Met yes;treatment indicated  -        Rehab Potential fair, will monitor progress closely  -        Therapy Frequency other (see comments)   3-14x a week  -        Predicted Duration of Therapy Intervention (days/wks) until d/c or all goals met  -        Anticipated Discharge Disposition skilled nursing facility;placement for care;home with 24/7 care;home with home health  -        Functional Level Prior    Ambulation 1-->assistive equipment  -  1-->assistive equipment  -      Transferring 0-->independent  -  0-->independent  -      Toileting 0-->independent  -  0-->independent  -      Bathing 0-->independent  -  0-->independent  -      Dressing 0-->independent  -  0-->independent  -      Eating 0-->independent  -  0-->independent  -      Communication 0-->understands/communicates  without difficulty  -  0-->understands/communicates without difficulty  -      Swallowing 0-->swallows foods/liquids without difficulty  -  0-->swallows foods/liquids without difficulty  -      Prior Functional Level Comment pt fatigued and took extra time  -        Vital Signs    Pre Systolic BP Rehab 161  -  -LM       Pre Treatment Diastolic BP 78  -BH 79  -LM       Post Systolic BP Rehab 154  -  -LM       Post Treatment Diastolic BP 73  -BH 78  -LM       Pretreatment Heart Rate (beats/min) 89  -BH 88  -LM       Intratreatment Heart Rate (beats/min) 93  -BH 96   Post 20 feet of amb  -LM       Posttreatment Heart Rate (beats/min) 87  -BH 89  -LM       Pre SpO2 (%) 94  -BH 94  -LM       O2 Delivery Pre Treatment room air  - room air  -LM       Intra SpO2 (%) 91  -BH 93   Post 20 feet of amb  -LM       O2 Delivery Intra Treatment room air  - room air  -LM       Post SpO2 (%) 95  -BH 94  -LM       O2 Delivery Post Treatment room air  - room air  -LM       Pre Patient Position Supine  - Supine  -LM       Intra Patient Position Sitting  - Sitting  -LM       Post Patient Position Supine  - Supine  -LM       Pain Assessment    Pain Assessment  0-10  -LM       Pain Score 6  -BH 6  -LM       Post Pain Score 6   between 6 and 7  -BH 6  -LM       Pain Location Abdomen   back  - Abdomen   And Back  -       Vision Assessment/Intervention    Visual Impairment WFL with corrective lenses  - WFL with corrective lenses  -       Cognitive Assessment/Intervention    Current Cognitive/Communication Assessment functional  - functional  -       Orientation Status oriented x 4  - oriented x 4  -LM       Follows Commands/Answers Questions 100% of the time;needs cueing;needs increased time  - 100% of the time;needs increased time  -       Personal Safety decreased awareness, need for assist;decreased awareness, need for safety  - decreased awareness, need for assist  -       Personal  Safety Interventions fall prevention program maintained;gait belt;muscle strengthening facilitated;nonskid shoes/slippers when out of bed;supervised activity  - fall prevention program maintained;gait belt;muscle strengthening facilitated;nonskid shoes/slippers when out of bed  -       ROM (Range of Motion)    General ROM no range of motion deficits identified  -        General ROM Detail BUE WFL, fair+ digit to nose and digit to thumb oppositoin  - BLE AROM WFL with exception of ankle DF - PROM WFL  -       MMT (Manual Muscle Testing)    General MMT Assessment upper extremity strength deficits identified;lower extremity strength deficits identified   please see PT for   -        General MMT Assessment Detail BUE  grossly 4-/5 BUE grossly 3+ to 4-/5  - BLE's - Hip flex - 4-/5; Knee flex/ext - 4-/5; Ankle DF - 2/5  -       Bed Mobility, Assessment/Treatment    Bed Mobility, Assistive Device bed rails;head of bed elevated  - bed rails;head of bed elevated  -       Bed Mob, Supine to Sit, Odem supervision required  - supervision required  -       Bed Mob, Sit to Supine, Odem supervision required  - minimum assist (75% patient effort)  -       Bed Mobility, Comment Pt struggled with getting back into the bed from sitting with lifiting his legs and then positioning himself into the bed but declined to let OT assist and wanted to complete on his own.   -        Transfer Assessment/Treatment    Transfers, Sit-Stand Odem contact guard assist  - contact guard assist  -LM       Transfers, Stand-Sit Odem contact guard assist  - contact guard assist  -LM       Transfers, Sit-Stand-Sit, Assist Device straight cane  - straight cane  -LM       Toilet Transfer, Odem minimum assist (75% patient effort)  - contact guard assist  -       Toilet Transfer, Assistive Device straight cane  - --   GB on wall  -LM       Transfer, Comment pt resistive and  got upset with gait belt and did not want OT to assist pt but pt very unsteady and unsafe. Pt later apologized about the gait belt. Pt asked for assist to stand off the toilet.  - Pt independent with pericare.  -       Functional Mobility    Functional Mobility- Comment Pt CGA but resitive to OT assist and gait belt needed to quickly get to the restroom. pt unsteady and unsafe and needed assist with IV pole. Pt later apologized about gait belt. Pt educated not to get up on his own.   -        Stairs Assessment/Treatment    Stairs, [place] Level  not tested  -       Lower Body Dressing Assessment/Training    LB Dressing Assess/Train, Comment pt SBA to Mod i sitting in bed to don and doff socks max difficulty and SOB easily.   -        Toileting Assessment/Training    Toileting Assess/Train, Comment Pt resisted assist CGA to stand and pull down clothing. Pt wanted OT to take off gait belt for toileting. pt later apologized and asked for assist to stand up off the toilet. pt did pericare sitting on the toilet with SBA.   -        Motor Skills/Interventions    Additional Documentation Balance Skills Training (Group);Fine Motor Coordination Training (Group)  -        Balance Skills Training    Sitting-Level of Assistance Contact guard;Close supervision  -        Standing-Level of Assistance Contact guard  -        Fine Motor Coordination Training    Opposition Right:;Left:;intact   grossly  -        Sensory Assessment/Intervention    Sensory Impairment --   numbness/tingling in feet  - numbness;tingling   Reports chronic in B feet  -       Light Touch LUE;RUE  - LLE;RLE  -       LUE Light Touch WNL  -        RUE Light Touch WNL  -        LLE Light Touch  mild impairment  -LM       RLE Light Touch  mild impairment  -       General Therapy Interventions    Planned Therapy Interventions activity intolerance;adaptive equipment training;ADL retraining;balance training;bed mobility  training;fine motor coordination training;home exercise program;ROM (Range of Motion);strengthening;transfer training  -        Positioning and Restraints    Pre-Treatment Position in bed  - in bed  -LM       Post Treatment Position bed  - bed  -LM       In Bed supine;notified nsg;encouraged to call for assist   contacted RN about OT unable to find call button, fall risk  - supine;call light within reach;with OT  -LM         User Key  (r) = Recorded By, (t) = Taken By, (c) = Cosigned By    Initials Name Effective Dates    LM Mirlande Dugan, PT 06/15/16 -      Dannielle Sanders, OTR/L 10/17/16 -      John Alvarez, RN 10/17/16 -            Occupational Therapy Education     Title: PT OT SLP Therapies (Active)     Topic: Occupational Therapy (Active)     Point: ADL training (Done)    Description: Instruct learner(s) on proper safety adaptation and remediation techniques during self care or transfers.   Instruct in proper use of assistive devices.    Learning Progress Summary    Learner Readiness Method Response Comment Documented by Status   Patient Acceptance E VU Educated pt about OT and POC. Educated about safety with t/f, mobility, and ADL. Educated to call for assist and not to get up on his own.  08/09/17 1144 Done               Point: Precautions (Done)    Description: Instruct learner(s) on prescribed precautions during self-care and functional transfers.    Learning Progress Summary    Learner Readiness Method Response Comment Documented by Status   Patient Acceptance E VU Educated pt about OT and POC. Educated about safety with t/f, mobility, and ADL. Educated to call for assist and not to get up on his own.  08/09/17 1144 Done               Point: Body mechanics (Done)    Description: Instruct learner(s) on proper positioning and spine alignment during self-care, functional mobility activities and/or exercises.    Learning Progress Summary    Learner Readiness Method Response Comment  Documented by Status   Patient Acceptance E VU Educated pt about OT and POC. Educated about safety with t/f, mobility, and ADL. Educated to call for assist and not to get up on his own.  08/09/17 1144 Done                      User Key     Initials Effective Dates Name Provider Type Discipline     10/17/16 -  Dannielle Sanders OTR/L Occupational Therapist OT                  OT Recommendation and Plan  Anticipated Discharge Disposition: skilled nursing facility, placement for care, home with 24/7 care, home with home health  Planned Therapy Interventions: activity intolerance, adaptive equipment training, ADL retraining, balance training, bed mobility training, fine motor coordination training, home exercise program, ROM (Range of Motion), strengthening, transfer training  Therapy Frequency: other (see comments) (3-14x a week)  Plan of Care Review  Plan Of Care Reviewed With: patient  Outcome Summary/Follow up Plan: OT ra completed this date. Pt t/f sup to sit SBA sit to sup SBA but max difficulty with legs and positioning. Pt sit to stand CGA besides toilet then min A. Pt extended time and efffort sitting in bed to don and doff socks. Pt CGA for mobility to toilet and back to bed with being unsteady. Pt may benefit from skilled OT to address strength, endurance, safety, and independence with ADL to reach PLOF/maximum potential/indepencence. If pt d/c home prior to all goals met pt will need 24/7 assist and HH therapy or may need further therapy before d/c home as pt lives alone.           OT Goals       08/09/17 0935          Transfer Training OT STG    Transfer Training OT STG, Date Established 08/09/17  [facility]      Transfer Training OT STG, Time to Achieve by discharge  -      Transfer Training OT STG, Activity Type all transfers  -      Transfer Training OT STG, [place] Level conditional independence  -      Patient Education OT LTG    Patient Education OT LTG, Date Established 08/09/17  -       Patient Education OT LTG, Time to Achieve by discharge  -      Patient Education OT LTG, Education Type HEP;home safety;energy conservation  -      Patient Education OT LTG, Education Understanding verbalizes understanding;demonstrates adequately;independent  -      ADL OT LTG    ADL OT LTG, Date Established 08/09/17  -      ADL OT LTG, Time to Achieve by discharge  -      ADL OT LTG, Activity Type ADL skills  -      ADL OT LTG, [place] Level modified independent  [facility]      Functional Mobility OT STG    Functional Mobility Goal OT STG, Date Established 08/09/17  -      Functional Mobility Goal OT STG, Time to Achieve by discharge  -      Functional Mobility Goal OT STG, [place] Level modified independent  [facility]      Functional Mobility Goal OT STG, Distance to Achieve to the bathroom;to the sink   around room  -      Activity Tolerance OT LTG    Activity Tolerance Goal OT LTG, Date Established 08/09/17  -      Activity Tolerance Goal OT LTG, Time to Achieve by discharge  -      Activity Tolerance Goal OT LTG, Activity Level 15 min activity;O2 sat >/equal to 90%;with 1 rest break  -        User Key  (r) = Recorded By, (t) = Taken By, (c) = Cosigned By    Initials Name Provider Type     Dannielle Sanders, OTR/L Occupational Therapist                Outcome Measures       08/09/17 0935 08/09/17 0900       How much help from another person do you currently need...    Turning from your back to your side while in flat bed without using bedrails?  3  -LM     Moving from lying on back to sitting on the side of a flat bed without bedrails?  3  -LM     Moving to and from a bed to a chair (including a wheelchair)?  3  -LM     Standing up from a chair using your arms (e.g., wheelchair, bedside chair)?  3  -LM     Climbing 3-5 steps with a railing?  3  -LM     To walk in hospital room?  3  -LM     AM-PAC 6 Clicks Score  18  -LM     How much help from another is currently needed...    Putting on and  taking off regular lower body clothing? 3  -BH      Bathing (including washing, rinsing, and drying) 3  -BH      Toileting (which includes using toilet bed pan or urinal) 3  -BH      Putting on and taking off regular upper body clothing 3  -BH      Taking care of personal grooming (such as brushing teeth) 4  -BH      Eating meals 4  -      Score 20  -      Functional Assessment    Outcome Measure Options AM-PAC 6 Clicks Daily Activity (OT)  - AM-PAC 6 Clicks Basic Mobility (PT)  -       User Key  (r) = Recorded By, (t) = Taken By, (c) = Cosigned By    Initials Name Provider Type    LM Mirlande Dugan, PT Physical Therapist     Dannielle Sanders OTR/L Occupational Therapist          Time Calculation:   OT Start Time: 0935  OT Stop Time: 1006  OT Time Calculation (min): 31 min    Therapy Charges for Today     Code Description Service Date Service Provider Modifiers Qty    18807675058  OT SELFCARE CURRENT 8/9/2017 Dannielle Sanders OTR/L GO, CJ 1    [id number] HC OT SELFCARE PROJECTED 8/9/2017 Dannielle Sanders OTR/L GO, CI 1    [id number]  OT SELF CARE/MGMT/TRAIN EA 15 MIN 8/9/2017 Dannielle Sanders OTR/L GO 1    [id number]  OT EVAL MOD COMPLEXITY 1 8/9/2017 Dannielle Sanders OTR/L GO 1          OT G-codes  OT Professional Judgement Used?: Yes  OT Functional Scales Options: AM-PAC 6 Clicks Daily Activity (OT)  Score: 20  Functional Limitation: Self care  Self Care Current Status (): At least 20 percent but less than 40 percent impaired, limited or restricted  Self Care Goal Status (): At least 1 percent but less than 20 percent impaired, limited or restricted    JUAN Villalobos/RAFAELA  8/9/2017   23

## 2020-03-06 NOTE — PRE-ANESTHESIA EVALUATION ADULT - NSANTHOSAYNRD_GEN_A_CORE
No. RYAN screening performed.  STOP BANG Legend: 0-2 = LOW Risk; 3-4 = INTERMEDIATE Risk; 5-8 = HIGH Risk/s/p UPPP

## 2020-03-06 NOTE — ASU DISCHARGE PLAN (ADULT/PEDIATRIC) - CARE PROVIDER_API CALL
Joe Reddy)  Surgery  [address], [address]  [place] [address]  Phone: [phone]  Fax: [phone]  Follow Up Time: 2 weeks

## 2020-03-06 NOTE — BRIEF OPERATIVE NOTE - COMMENTS
Patient tolerated procedure well. LMA removed without complication and patient transferred to the PACU in hemodynamically stable condition

## 2020-03-06 NOTE — ASU DISCHARGE PLAN (ADULT/PEDIATRIC) - ASU DC SPECIAL INSTRUCTIONSFT
Keep dressing dry for 24 hours, may take dressing off and shower tomorrow, do not remove steri strips    May take Tylenol and Motrin for excess pain relief     BATHING: Please do not submerge wound underwater.  ACTIVITY: No heavy lifting anything more than 10-15lbs or straining. Otherwise, you may return to your usual level of physical activity. If you are taking narcotic pain medication (such as oxycodone or Percocet), do NOT drive a car, operate machinery or make important decisions.  NOTIFY YOUR SURGEON IF: You have any bleeding that does not stop, any pus draining from your wound, any fever (over 100.4 F) or chills, persistent nausea/vomiting with inability to tolerate food or liquids, persistent diarrhea, or if your pain is not controlled on your discharge pain medications.  FOLLOW-UP:  1. Please call to make a follow-up appointment in 1-2 weeks upon discharge from the hospital  2. Please follow up with your primary care physician in one week regarding your hospitalization.

## 2020-03-06 NOTE — ASU PATIENT PROFILE, ADULT - PMH
Bone, giant cell tumor  Left proximal tibia, 2004 - removed, benign  BPH (benign prostatic hyperplasia)    Chronic maxillary sinusitis    Cystitis  01/04/20  Diabetes mellitus  Type 2  Dyslipidemia    Essential hypertension    GERD (gastroesophageal reflux disease)    [place] (hard of hearing)  bilateral hearing aides  Incisional hernia    Obstructive sleep apnea  h/o UPPP done 5/2005 pt states never retested & no longer required CPAP mechine  Overactive bladder    Umbilical hernia    Weight loss  60lbs - with diet and exersize

## 2020-03-06 NOTE — ASU PATIENT PROFILE, ADULT - PSH
Deviated septum  nasal cyst 10/2015  Giant cell tumor  excision left tibia, aspiration intralesional curettage, cementation, insertion of Nydia pins,  12/2004 - benign  H/O arthroscopy of right knee  meniscus injury 2002  H/O cataract removal with insertion of prosthetic lens  b/l, 11/2013  H/O excision of mass  - posterior neck cyst, 10/2019  H/O lipoma  eexcision from chest & back 4/2011, 2012 - chest & axilia  History of cholecystectomy  2003  History of tonsillectomy  1968  S/P UPPP (uvulopalatopharyngoplasty)  h/o 2005  Scrotal cyst  12/2011

## 2020-03-06 NOTE — BRIEF OPERATIVE NOTE - NSICDXBRIEFPROCEDURE_GEN_ALL_CORE_FT
PROCEDURES:  Incisional hernia repair with mesh 06-Mar-2020 14:36:38 Reduction and repair of incisional hernia with mesh Sandro Diallo

## 2020-03-08 PROBLEM — N40.0 BENIGN PROSTATIC HYPERPLASIA WITHOUT LOWER URINARY TRACT SYMPTOMS: Chronic | Status: ACTIVE | Noted: 2020-02-24

## 2020-03-08 PROBLEM — N30.90 CYSTITIS, UNSPECIFIED WITHOUT HEMATURIA: Chronic | Status: ACTIVE | Noted: 2020-02-24

## 2020-03-12 ENCOUNTER — OUTPATIENT (OUTPATIENT)
Dept: OUTPATIENT SERVICES | Facility: HOSPITAL | Age: 60
LOS: 1 days | End: 2020-03-12
Payer: COMMERCIAL

## 2020-03-12 VITALS
SYSTOLIC BLOOD PRESSURE: 120 MMHG | HEART RATE: 63 BPM | WEIGHT: 199.96 LBS | TEMPERATURE: 98 F | HEIGHT: 68 IN | RESPIRATION RATE: 16 BRPM | OXYGEN SATURATION: 97 % | DIASTOLIC BLOOD PRESSURE: 80 MMHG

## 2020-03-12 DIAGNOSIS — H90.42 SENSORINEURAL HEARING LOSS, UNILATERAL, LEFT EAR, WITH UNRESTRICTED HEARING ON THE CONTRALATERAL SIDE: ICD-10-CM

## 2020-03-12 DIAGNOSIS — Z98.89 OTHER SPECIFIED POSTPROCEDURAL STATES: Chronic | ICD-10-CM

## 2020-03-12 DIAGNOSIS — D48.9 NEOPLASM OF UNCERTAIN BEHAVIOR, UNSPECIFIED: Chronic | ICD-10-CM

## 2020-03-12 DIAGNOSIS — Z98.890 OTHER SPECIFIED POSTPROCEDURAL STATES: Chronic | ICD-10-CM

## 2020-03-12 DIAGNOSIS — L72.9 FOLLICULAR CYST OF THE SKIN AND SUBCUTANEOUS TISSUE, UNSPECIFIED: Chronic | ICD-10-CM

## 2020-03-12 DIAGNOSIS — Z87.898 PERSONAL HISTORY OF OTHER SPECIFIED CONDITIONS: Chronic | ICD-10-CM

## 2020-03-12 DIAGNOSIS — H91.90 UNSPECIFIED HEARING LOSS, UNSPECIFIED EAR: ICD-10-CM

## 2020-03-12 DIAGNOSIS — Z98.49 CATARACT EXTRACTION STATUS, UNSPECIFIED EYE: Chronic | ICD-10-CM

## 2020-03-12 DIAGNOSIS — J34.2 DEVIATED NASAL SEPTUM: Chronic | ICD-10-CM

## 2020-03-12 LAB
ANION GAP SERPL CALC-SCNC: 16 MMO/L — HIGH (ref 7–14)
APTT BLD: 29.4 SEC — SIGNIFICANT CHANGE UP (ref 27.5–36.3)
BUN SERPL-MCNC: 23 MG/DL — SIGNIFICANT CHANGE UP (ref 7–23)
CALCIUM SERPL-MCNC: 10.4 MG/DL — SIGNIFICANT CHANGE UP (ref 8.4–10.5)
CHLORIDE SERPL-SCNC: 100 MMOL/L — SIGNIFICANT CHANGE UP (ref 98–107)
CO2 SERPL-SCNC: 24 MMOL/L — SIGNIFICANT CHANGE UP (ref 22–31)
CREAT SERPL-MCNC: 0.6 MG/DL — SIGNIFICANT CHANGE UP (ref 0.5–1.3)
GLUCOSE SERPL-MCNC: 126 MG/DL — HIGH (ref 70–99)
HBA1C BLD-MCNC: 6 % — HIGH (ref 4–5.6)
HCT VFR BLD CALC: 47.3 % — SIGNIFICANT CHANGE UP (ref 39–50)
HGB BLD-MCNC: 16 G/DL — SIGNIFICANT CHANGE UP (ref 13–17)
INR BLD: 0.99 — SIGNIFICANT CHANGE UP (ref 0.88–1.17)
MCHC RBC-ENTMCNC: 29.4 PG — SIGNIFICANT CHANGE UP (ref 27–34)
MCHC RBC-ENTMCNC: 33.8 % — SIGNIFICANT CHANGE UP (ref 32–36)
MCV RBC AUTO: 86.9 FL — SIGNIFICANT CHANGE UP (ref 80–100)
NRBC # FLD: 0 K/UL — SIGNIFICANT CHANGE UP (ref 0–0)
PLATELET # BLD AUTO: 225 K/UL — SIGNIFICANT CHANGE UP (ref 150–400)
PMV BLD: 9.9 FL — SIGNIFICANT CHANGE UP (ref 7–13)
POTASSIUM SERPL-MCNC: 4.3 MMOL/L — SIGNIFICANT CHANGE UP (ref 3.5–5.3)
POTASSIUM SERPL-SCNC: 4.3 MMOL/L — SIGNIFICANT CHANGE UP (ref 3.5–5.3)
PROTHROM AB SERPL-ACNC: 11.3 SEC — SIGNIFICANT CHANGE UP (ref 9.8–13.1)
RBC # BLD: 5.44 M/UL — SIGNIFICANT CHANGE UP (ref 4.2–5.8)
RBC # FLD: 13.9 % — SIGNIFICANT CHANGE UP (ref 10.3–14.5)
SODIUM SERPL-SCNC: 140 MMOL/L — SIGNIFICANT CHANGE UP (ref 135–145)
WBC # BLD: 4.42 K/UL — SIGNIFICANT CHANGE UP (ref 3.8–10.5)
WBC # FLD AUTO: 4.42 K/UL — SIGNIFICANT CHANGE UP (ref 3.8–10.5)

## 2020-03-12 PROCEDURE — 93010 ELECTROCARDIOGRAM REPORT: CPT

## 2020-03-12 NOTE — H&P PST ADULT - NSICDXPROBLEM_GEN_ALL_CORE_FT
PROBLEM DIAGNOSES  Problem: Hearing loss  Assessment and Plan: Pt scheduled for surgery and preop instructions including instructions for taking own GI protection on the day of surgery, given verbally and with use of  written materials, and patient confirming understanding of such instructions using  teach back method.  OR booking notified of risk for sleep apnea, DM and tibial carrie   Request CC for abnormal EKG and CAD hx

## 2020-03-12 NOTE — H&P PST ADULT - NSICDXPASTMEDICALHX_GEN_ALL_CORE_FT
PAST MEDICAL HISTORY:  Bone, giant cell tumor Left proximal tibia, 2004 - removed, benign - hardware present    BPH (benign prostatic hyperplasia)     Chronic maxillary sinusitis     Cystitis 01/04/20    Diabetes mellitus Type 2    Dyslipidemia     Essential hypertension     GERD (gastroesophageal reflux disease)     [place] (hard of hearing) bilateral hearing aides    Incisional hernia repaired3/20    Obstructive sleep apnea h/o UPPP done 5/2005 pt states never retested & no longer required CPAP mechine    Overactive bladder     Umbilical hernia     Weight loss 60lbs - with diet and exersize

## 2020-03-12 NOTE — H&P PST ADULT - CARDIOVASCULAR COMMENTS
Hx of abnormal stress test 2016 - Cardiac cath with no stents placed - pt denies CP or SOB - abnormal EKG

## 2020-03-12 NOTE — H&P PST ADULT - HISTORY OF PRESENT ILLNESS
Pt is a 59 yr old male scheduled for right Cochlear Implant with Dr Draper tentatively 3/26/20 - pt has had right sided hearing loss for many years and has hearing aids and now has  a CROS system microphone in place. Pt hx of HTN, DM, HLD, BPH and S/P ventral hernia repair at [facility] 3/20 - pt denies pain or drainage Pt is a 59 yr old male scheduled for right Cochlear Implant with Dr Draper tentatively 3/26/20 - pt has had right sided hearing loss for many years and has had hearing aids and now has  a CROS system microphone in place. Pt hx of HTN, DM, HLD, BPH and S/P ventral hernia repair at [facility] 3/20 - pt denies pain or drainage

## 2020-03-12 NOTE — H&P PST ADULT - NSICDXPASTSURGICALHX_GEN_ALL_CORE_FT
PAST SURGICAL HISTORY:  Deviated septum nasal cyst 10/2015    Giant cell tumor excision left tibia, aspiration intralesional curettage, cementation, insertion of Nydia pins,  12/2004 - benign    H/O arthroscopy of right knee meniscus injury 2002    H/O cataract removal with insertion of prosthetic lens b/l, 11/2013    H/O excision of mass - posterior neck cyst, 10/2019    H/O lipoma eexcision from chest & back 4/2011, 2012 - chest & axilia    History of cholecystectomy 2003    History of tonsillectomy 1968    S/P UPPP (uvulopalatopharyngoplasty) h/o 2005    Scrotal cyst 12/2011

## 2020-04-20 ENCOUNTER — APPOINTMENT (OUTPATIENT)
Dept: SPEECH THERAPY | Facility: CLINIC | Age: 60
End: 2020-04-20

## 2020-05-07 ENCOUNTER — APPOINTMENT (OUTPATIENT)
Dept: SURGERY | Facility: CLINIC | Age: 60
End: 2020-05-07

## 2020-05-11 PROBLEM — D48.0 NEOPLASM OF UNCERTAIN BEHAVIOR OF BONE AND ARTICULAR CARTILAGE: Chronic | Status: ACTIVE | Noted: 2020-02-24

## 2020-05-11 PROBLEM — K43.2 INCISIONAL HERNIA WITHOUT OBSTRUCTION OR GANGRENE: Chronic | Status: ACTIVE | Noted: 2020-02-24

## 2020-06-29 RX ORDER — METFORMIN HYDROCHLORIDE 850 MG/1
2 TABLET ORAL
Qty: 0 | Refills: 0 | DISCHARGE

## 2020-06-30 ENCOUNTER — OUTPATIENT (OUTPATIENT)
Dept: OUTPATIENT SERVICES | Facility: HOSPITAL | Age: 60
LOS: 1 days | End: 2020-06-30
Payer: COMMERCIAL

## 2020-06-30 VITALS
DIASTOLIC BLOOD PRESSURE: 78 MMHG | HEIGHT: 68 IN | WEIGHT: 199.08 LBS | RESPIRATION RATE: 18 BRPM | OXYGEN SATURATION: 98 % | HEART RATE: 67 BPM | SYSTOLIC BLOOD PRESSURE: 118 MMHG | TEMPERATURE: 97 F

## 2020-06-30 DIAGNOSIS — Z01.818 ENCOUNTER FOR OTHER PREPROCEDURAL EXAMINATION: ICD-10-CM

## 2020-06-30 DIAGNOSIS — Z98.89 OTHER SPECIFIED POSTPROCEDURAL STATES: Chronic | ICD-10-CM

## 2020-06-30 DIAGNOSIS — Z98.890 OTHER SPECIFIED POSTPROCEDURAL STATES: Chronic | ICD-10-CM

## 2020-06-30 DIAGNOSIS — A21.9: ICD-10-CM

## 2020-06-30 DIAGNOSIS — L72.9 FOLLICULAR CYST OF THE SKIN AND SUBCUTANEOUS TISSUE, UNSPECIFIED: Chronic | ICD-10-CM

## 2020-06-30 DIAGNOSIS — D48.9 NEOPLASM OF UNCERTAIN BEHAVIOR, UNSPECIFIED: Chronic | ICD-10-CM

## 2020-06-30 DIAGNOSIS — Z87.898 PERSONAL HISTORY OF OTHER SPECIFIED CONDITIONS: Chronic | ICD-10-CM

## 2020-06-30 DIAGNOSIS — Z98.49 CATARACT EXTRACTION STATUS, UNSPECIFIED EYE: Chronic | ICD-10-CM

## 2020-06-30 DIAGNOSIS — J34.2 DEVIATED NASAL SEPTUM: Chronic | ICD-10-CM

## 2020-06-30 DIAGNOSIS — H91.90 UNSPECIFIED HEARING LOSS, UNSPECIFIED EAR: ICD-10-CM

## 2020-06-30 LAB
ALBUMIN SERPL ELPH-MCNC: 4.5 G/DL — SIGNIFICANT CHANGE UP (ref 3.3–5)
ALP SERPL-CCNC: 54 U/L — SIGNIFICANT CHANGE UP (ref 40–120)
ALT FLD-CCNC: 32 U/L — SIGNIFICANT CHANGE UP (ref 4–41)
ANION GAP SERPL CALC-SCNC: 13 MMO/L — SIGNIFICANT CHANGE UP (ref 7–14)
APTT BLD: 29.7 SEC — SIGNIFICANT CHANGE UP (ref 27–36.3)
AST SERPL-CCNC: 22 U/L — SIGNIFICANT CHANGE UP (ref 4–40)
BILIRUB SERPL-MCNC: 1.2 MG/DL — SIGNIFICANT CHANGE UP (ref 0.2–1.2)
BUN SERPL-MCNC: 24 MG/DL — HIGH (ref 7–23)
CALCIUM SERPL-MCNC: 10.2 MG/DL — SIGNIFICANT CHANGE UP (ref 8.4–10.5)
CHLORIDE SERPL-SCNC: 99 MMOL/L — SIGNIFICANT CHANGE UP (ref 98–107)
CO2 SERPL-SCNC: 25 MMOL/L — SIGNIFICANT CHANGE UP (ref 22–31)
CREAT SERPL-MCNC: 0.46 MG/DL — LOW (ref 0.5–1.3)
GLUCOSE SERPL-MCNC: 119 MG/DL — HIGH (ref 70–99)
HBA1C BLD-MCNC: 6.4 % — HIGH (ref 4–5.6)
HCT VFR BLD CALC: 47.3 % — SIGNIFICANT CHANGE UP (ref 39–50)
HGB BLD-MCNC: 15.9 G/DL — SIGNIFICANT CHANGE UP (ref 13–17)
INR BLD: 0.89 — SIGNIFICANT CHANGE UP (ref 0.88–1.17)
MCHC RBC-ENTMCNC: 29.7 PG — SIGNIFICANT CHANGE UP (ref 27–34)
MCHC RBC-ENTMCNC: 33.6 % — SIGNIFICANT CHANGE UP (ref 32–36)
MCV RBC AUTO: 88.4 FL — SIGNIFICANT CHANGE UP (ref 80–100)
NRBC # FLD: 0 K/UL — SIGNIFICANT CHANGE UP (ref 0–0)
PLATELET # BLD AUTO: 177 K/UL — SIGNIFICANT CHANGE UP (ref 150–400)
PMV BLD: 9.9 FL — SIGNIFICANT CHANGE UP (ref 7–13)
POTASSIUM SERPL-MCNC: 4.6 MMOL/L — SIGNIFICANT CHANGE UP (ref 3.5–5.3)
POTASSIUM SERPL-SCNC: 4.6 MMOL/L — SIGNIFICANT CHANGE UP (ref 3.5–5.3)
PROT SERPL-MCNC: 6.5 G/DL — SIGNIFICANT CHANGE UP (ref 6–8.3)
PROTHROM AB SERPL-ACNC: 10.2 SEC — SIGNIFICANT CHANGE UP (ref 9.8–13.1)
RBC # BLD: 5.35 M/UL — SIGNIFICANT CHANGE UP (ref 4.2–5.8)
RBC # FLD: 14.6 % — HIGH (ref 10.3–14.5)
SODIUM SERPL-SCNC: 137 MMOL/L — SIGNIFICANT CHANGE UP (ref 135–145)
WBC # BLD: 6.18 K/UL — SIGNIFICANT CHANGE UP (ref 3.8–10.5)
WBC # FLD AUTO: 6.18 K/UL — SIGNIFICANT CHANGE UP (ref 3.8–10.5)

## 2020-06-30 PROCEDURE — 93010 ELECTROCARDIOGRAM REPORT: CPT

## 2020-06-30 NOTE — H&P PST ADULT - FALLEN IN THE PAST
CC:  Jeremy Mitchell is here today for a 6 month diabetic f/u.    Medications: medications verified and updated  Refills needed today?  YES  Denies known Latex allergy or symptoms of Latex sensitivity.  Patient would like communication of their results via:      Cell Phone:   Telephone Information:   Mobile [phone]     Okay to leave a message containing results? Yes  Tobacco history: verified    Health Maintenance Due   Topic Date Due   • Pneumococcal Vaccine 0-64 (1 of 1 - PPSV23) 03/27/1974   • DTaP/Tdap/Td Vaccine (1 - Tdap) 02/21/2012   • Shingles Vaccine (1 of 2) 03/27/2018   • Diabetes Eye Exam  10/01/2019     Patient is due for topics as listed above but is not proceeding with them.  [facility] status addressed. Patient Active.          no

## 2020-06-30 NOTE — H&P PST ADULT - MALLAMPATI CLASS
Class II - visualization of the soft palate, fauces, and uvula/with phonation on phonation/Class II - visualization of the soft palate, fauces, and uvula Class IV (difficult) - the soft palate is not visible at all/on phonation, poor visibility

## 2020-06-30 NOTE — H&P PST ADULT - NSICDXPASTMEDICALHX_GEN_ALL_CORE_FT
PAST MEDICAL HISTORY:  Bone, giant cell tumor Left proximal tibia, 2004 - removed, benign - hardware present    BPH (benign prostatic hyperplasia)     Chronic maxillary sinusitis     Cystitis 01/04/20    Diabetes mellitus Type 2    Dyslipidemia     Essential hypertension     GERD (gastroesophageal reflux disease)     [place] (hard of hearing) bilateral hearing aides    Incisional hernia repaired3/20    Obstructive sleep apnea h/o UPPP done 5/2005 pt states never retested & no longer required CPAP mechine    Overactive bladder     Umbilical hernia     Weight loss 60lbs - with diet and exersize PAST MEDICAL HISTORY:  Bone, giant cell tumor Left proximal tibia, 2004 - removed, benign - hardware present    BPH (benign prostatic hyperplasia)     Chronic maxillary sinusitis     Cystitis 01/04/20    Diabetes mellitus Type 2    Dyslipidemia     Essential hypertension     False positive stress test 2016    GERD (gastroesophageal reflux disease)     [place] (hard of hearing) bilateral hearing aides    Incisional hernia repaired3/20    Obstructive sleep apnea h/o UPPP done 5/2005 pt states never retested & no longer required CPAP mechine    Overactive bladder     Sleep apnea     Umbilical hernia     Weight loss 60lbs - with diet and exersize PAST MEDICAL HISTORY:  Bone, giant cell tumor Left proximal tibia, 2004 - removed, benign - hardware present    BPH (benign prostatic hyperplasia)     Chronic maxillary sinusitis     Cystitis 01/04/20    Diabetes mellitus Type 2    Dyslipidemia     Essential hypertension     False positive stress test 2016    GERD (gastroesophageal reflux disease)     [place] (hard of hearing) bilateral hearing aides    Incisional hernia repaired3/20    Obstructive sleep apnea h/o UPPP done 5/2005 pt states never retested & no longer required CPAP mechine    Overactive bladder     Sleep apnea     Umbilical hernia     Weight loss 60lbs - with diet and exercise

## 2020-06-30 NOTE — H&P PST ADULT - NSANTHOSAYNRD_GEN_A_CORE
s/p UPPP/No. RYAN screening performed.  STOP BANG Legend: 0-2 = LOW Risk; 3-4 = INTERMEDIATE Risk; 5-8 = HIGH Risk Yes

## 2020-06-30 NOTE — H&P PST ADULT - VISION (WITH CORRECTIVE LENSES IF THE PATIENT USUALLY WEARS THEM):
Normal vision: sees adequately in most situations; can see medication labels, newsprint eyeglasses for reading

## 2020-06-30 NOTE — H&P PST ADULT - REASON FOR ADMISSION
"constant ringing in the right ear, my right ear is deaf, single sided deafness...they say the brain is trying to compensate for the lost sounds, cochlear implant"

## 2020-06-30 NOTE — H&P PST ADULT - NSALCOHOLAMT_GEN_A_CORE_SD
Possible new onset MS based on CT findings as suggested by radiologist  Neurology consult  No steroids until further imaging completed and neurology evaluated 1-2 drinks

## 2020-06-30 NOTE — H&P PST ADULT - NSICDXPROBLEM_GEN_ALL_CORE_FT
PROBLEM DIAGNOSES  Problem: Deafness  Assessment and Plan: Pt. is scheduled for a right cochlear implant 7/9/20.  Pt. verbalized understanding of instructions.  Pt. is scheduled for medical clearance and is scheduled for COVID test 7/6/20.  Notified Dr. Morales of pt's. H/O sleep apnea.  Pt. is able to continue with surgery at [facility]. PROBLEM DIAGNOSES  Problem: Deafness  Assessment and Plan: Pt. is scheduled for a right cochlear implant 7/9/20.  Pt. verbalized understanding of instructions.  Pt. instructed to take last dose of Metformin in the morning the day before surgery.  Pt. is scheduled for medical clearance. Pt.  is scheduled for COVID test 7/6/20.  Notified Dr. Morales of pt's. H/O sleep apnea.  Pt. is able to continue with surgery at [facility].   OR booking notified of H/O sleep apnea and diabetes.

## 2020-06-30 NOTE — H&P PST ADULT - LAST CARDIAC ANGIOGRAM/IMAGING
05/02/16 - wnl, no intervention 05/02/16, pt. denies stent placement, "maybe done at [facility]"

## 2020-06-30 NOTE — H&P PST ADULT - ACTIVITY
walks, 18 holes 2xwk, gym 5xwk 1.5hr walks 18 holes 1 day and 9 holes 1 day wkly, runs 4-5 miles 2 days/wk

## 2020-07-06 ENCOUNTER — APPOINTMENT (OUTPATIENT)
Dept: DISASTER EMERGENCY | Facility: CLINIC | Age: 60
End: 2020-07-06

## 2020-07-07 LAB — SARS-COV-2 N GENE NPH QL NAA+PROBE: NOT DETECTED

## 2020-07-08 ENCOUNTER — TRANSCRIPTION ENCOUNTER (OUTPATIENT)
Age: 60
End: 2020-07-08

## 2020-07-09 ENCOUNTER — OUTPATIENT (OUTPATIENT)
Dept: OUTPATIENT SERVICES | Facility: HOSPITAL | Age: 60
LOS: 1 days | Discharge: HOME | End: 2020-07-09

## 2020-07-09 VITALS
WEIGHT: 199.08 LBS | HEART RATE: 57 BPM | DIASTOLIC BLOOD PRESSURE: 80 MMHG | HEIGHT: 68 IN | RESPIRATION RATE: 16 BRPM | SYSTOLIC BLOOD PRESSURE: 119 MMHG | OXYGEN SATURATION: 97 % | TEMPERATURE: 98 F

## 2020-07-09 VITALS
RESPIRATION RATE: 17 BRPM | TEMPERATURE: 98 F | DIASTOLIC BLOOD PRESSURE: 87 MMHG | OXYGEN SATURATION: 99 % | SYSTOLIC BLOOD PRESSURE: 139 MMHG | HEART RATE: 86 BPM

## 2020-07-09 DIAGNOSIS — Z98.890 OTHER SPECIFIED POSTPROCEDURAL STATES: Chronic | ICD-10-CM

## 2020-07-09 DIAGNOSIS — Z87.898 PERSONAL HISTORY OF OTHER SPECIFIED CONDITIONS: Chronic | ICD-10-CM

## 2020-07-09 DIAGNOSIS — A21.9: ICD-10-CM

## 2020-07-09 DIAGNOSIS — Z98.89 OTHER SPECIFIED POSTPROCEDURAL STATES: Chronic | ICD-10-CM

## 2020-07-09 DIAGNOSIS — J34.2 DEVIATED NASAL SEPTUM: Chronic | ICD-10-CM

## 2020-07-09 DIAGNOSIS — Z98.49 CATARACT EXTRACTION STATUS, UNSPECIFIED EYE: Chronic | ICD-10-CM

## 2020-07-09 DIAGNOSIS — L72.9 FOLLICULAR CYST OF THE SKIN AND SUBCUTANEOUS TISSUE, UNSPECIFIED: Chronic | ICD-10-CM

## 2020-07-09 DIAGNOSIS — D48.9 NEOPLASM OF UNCERTAIN BEHAVIOR, UNSPECIFIED: Chronic | ICD-10-CM

## 2020-07-09 LAB — GLUCOSE BLDC GLUCOMTR-MCNC: 124 MG/DL — HIGH (ref 70–99)

## 2020-07-09 RX ORDER — TELMISARTAN 20 MG/1
1 TABLET ORAL
Qty: 0 | Refills: 0 | DISCHARGE

## 2020-07-09 RX ORDER — TAMSULOSIN HYDROCHLORIDE 0.4 MG/1
1 CAPSULE ORAL
Qty: 0 | Refills: 0 | DISCHARGE

## 2020-07-09 RX ORDER — METFORMIN HYDROCHLORIDE 850 MG/1
2 TABLET ORAL
Qty: 0 | Refills: 0 | DISCHARGE

## 2020-07-09 RX ORDER — MELOXICAM 15 MG/1
1 TABLET ORAL
Qty: 0 | Refills: 0 | DISCHARGE

## 2020-07-09 NOTE — ASU DISCHARGE PLAN (ADULT/PEDIATRIC) - CARE PROVIDER_API CALL
Jamey Draper E  Neurotology  [address] [facility], [address]  [place] [address]  Phone: [phone]  Fax: [phone]  Follow Up Time:

## 2020-07-09 NOTE — ASU DISCHARGE PLAN (ADULT/PEDIATRIC) - CALL YOUR DOCTOR IF YOU HAVE ANY OF THE FOLLOWING:
Wound/Surgical Site with redness, or foul smelling discharge or pus/Pain not relieved by Medications/Nausea and vomiting that does not stop/Bleeding that does not stop/Swelling that gets worse/Fever greater than (need to indicate Fahrenheit or Celsius) Swelling that gets worse/Wound/Surgical Site with redness, or foul smelling discharge or pus/Pain not relieved by Medications/Bleeding that does not stop/Inability to tolerate liquids or foods/Fever greater than (need to indicate Fahrenheit or Celsius)/Nausea and vomiting that does not stop/Unable to urinate

## 2020-07-09 NOTE — ASU DISCHARGE PLAN (ADULT/PEDIATRIC) - PAIN MANAGEMENT
oxycodone, for severe pain only/Prescription called to pharmacy oxycodone, for severe pain only; No tyleno or tylenol containing meds until after 10:41pm tonight/Prescription called to pharmacy

## 2020-07-28 ENCOUNTER — APPOINTMENT (OUTPATIENT)
Dept: SPEECH THERAPY | Facility: CLINIC | Age: 60
End: 2020-07-28

## 2020-07-28 ENCOUNTER — OUTPATIENT (OUTPATIENT)
Dept: OUTPATIENT SERVICES | Facility: HOSPITAL | Age: 60
LOS: 1 days | Discharge: HOME | End: 2020-07-28

## 2020-07-28 DIAGNOSIS — Z98.89 OTHER SPECIFIED POSTPROCEDURAL STATES: Chronic | ICD-10-CM

## 2020-07-28 DIAGNOSIS — Z98.890 OTHER SPECIFIED POSTPROCEDURAL STATES: Chronic | ICD-10-CM

## 2020-07-28 DIAGNOSIS — D48.9 NEOPLASM OF UNCERTAIN BEHAVIOR, UNSPECIFIED: Chronic | ICD-10-CM

## 2020-07-28 DIAGNOSIS — Z87.898 PERSONAL HISTORY OF OTHER SPECIFIED CONDITIONS: Chronic | ICD-10-CM

## 2020-07-28 DIAGNOSIS — Z98.49 CATARACT EXTRACTION STATUS, UNSPECIFIED EYE: Chronic | ICD-10-CM

## 2020-07-28 DIAGNOSIS — L72.9 FOLLICULAR CYST OF THE SKIN AND SUBCUTANEOUS TISSUE, UNSPECIFIED: Chronic | ICD-10-CM

## 2020-07-28 DIAGNOSIS — J34.2 DEVIATED NASAL SEPTUM: Chronic | ICD-10-CM

## 2020-08-03 PROBLEM — G47.30 SLEEP APNEA, UNSPECIFIED: Chronic | Status: ACTIVE | Noted: 2020-06-30

## 2020-08-03 PROBLEM — R63.4 ABNORMAL WEIGHT LOSS: Chronic | Status: ACTIVE | Noted: 2019-10-04

## 2020-08-03 PROBLEM — Z78.9 OTHER SPECIFIED HEALTH STATUS: Chronic | Status: ACTIVE | Noted: 2020-06-30

## 2020-08-05 ENCOUNTER — APPOINTMENT (OUTPATIENT)
Dept: SPEECH THERAPY | Facility: CLINIC | Age: 60
End: 2020-08-05

## 2020-08-06 DIAGNOSIS — H90.5 UNSPECIFIED SENSORINEURAL HEARING LOSS: ICD-10-CM

## 2020-08-07 ENCOUNTER — APPOINTMENT (OUTPATIENT)
Dept: SPEECH THERAPY | Facility: CLINIC | Age: 60
End: 2020-08-07

## 2020-08-25 ENCOUNTER — APPOINTMENT (OUTPATIENT)
Dept: SPEECH THERAPY | Facility: CLINIC | Age: 60
End: 2020-08-25

## 2020-09-17 ENCOUNTER — APPOINTMENT (OUTPATIENT)
Dept: SPEECH THERAPY | Facility: CLINIC | Age: 60
End: 2020-09-17

## 2020-09-21 ENCOUNTER — APPOINTMENT (OUTPATIENT)
Dept: SPEECH THERAPY | Facility: CLINIC | Age: 60
End: 2020-09-21

## 2020-10-09 DIAGNOSIS — H90.3 SENSORINEURAL HEARING LOSS, BILATERAL: ICD-10-CM

## 2020-10-20 ENCOUNTER — APPOINTMENT (OUTPATIENT)
Dept: SPEECH THERAPY | Facility: CLINIC | Age: 60
End: 2020-10-20

## 2021-01-04 ENCOUNTER — APPOINTMENT (OUTPATIENT)
Dept: SPEECH THERAPY | Facility: CLINIC | Age: 61
End: 2021-01-04

## 2021-04-07 ENCOUNTER — APPOINTMENT (OUTPATIENT)
Dept: SPEECH THERAPY | Facility: CLINIC | Age: 61
End: 2021-04-07

## 2021-04-07 ENCOUNTER — OUTPATIENT (OUTPATIENT)
Dept: OUTPATIENT SERVICES | Facility: HOSPITAL | Age: 61
LOS: 1 days | Discharge: HOME | End: 2021-04-07

## 2021-04-07 DIAGNOSIS — L72.9 FOLLICULAR CYST OF THE SKIN AND SUBCUTANEOUS TISSUE, UNSPECIFIED: Chronic | ICD-10-CM

## 2021-04-07 DIAGNOSIS — Z98.89 OTHER SPECIFIED POSTPROCEDURAL STATES: Chronic | ICD-10-CM

## 2021-04-07 DIAGNOSIS — Z98.890 OTHER SPECIFIED POSTPROCEDURAL STATES: Chronic | ICD-10-CM

## 2021-04-07 DIAGNOSIS — J34.2 DEVIATED NASAL SEPTUM: Chronic | ICD-10-CM

## 2021-04-07 DIAGNOSIS — Z87.898 PERSONAL HISTORY OF OTHER SPECIFIED CONDITIONS: Chronic | ICD-10-CM

## 2021-04-07 DIAGNOSIS — Z98.49 CATARACT EXTRACTION STATUS, UNSPECIFIED EYE: Chronic | ICD-10-CM

## 2021-04-07 DIAGNOSIS — D48.9 NEOPLASM OF UNCERTAIN BEHAVIOR, UNSPECIFIED: Chronic | ICD-10-CM

## 2021-04-15 DIAGNOSIS — H90.3 SENSORINEURAL HEARING LOSS, BILATERAL: ICD-10-CM

## 2021-05-18 ENCOUNTER — APPOINTMENT (OUTPATIENT)
Dept: SPEECH THERAPY | Facility: CLINIC | Age: 61
End: 2021-05-18

## 2021-12-21 ENCOUNTER — APPOINTMENT (OUTPATIENT)
Dept: SPEECH THERAPY | Facility: CLINIC | Age: 61
End: 2021-12-21

## 2021-12-21 ENCOUNTER — OUTPATIENT (OUTPATIENT)
Dept: OUTPATIENT SERVICES | Facility: HOSPITAL | Age: 61
LOS: 1 days | Discharge: HOME | End: 2021-12-21

## 2021-12-21 DIAGNOSIS — Z87.898 PERSONAL HISTORY OF OTHER SPECIFIED CONDITIONS: Chronic | ICD-10-CM

## 2021-12-21 DIAGNOSIS — Z98.49 CATARACT EXTRACTION STATUS, UNSPECIFIED EYE: Chronic | ICD-10-CM

## 2021-12-21 DIAGNOSIS — J34.2 DEVIATED NASAL SEPTUM: Chronic | ICD-10-CM

## 2021-12-21 DIAGNOSIS — Z98.890 OTHER SPECIFIED POSTPROCEDURAL STATES: Chronic | ICD-10-CM

## 2021-12-21 DIAGNOSIS — Z98.89 OTHER SPECIFIED POSTPROCEDURAL STATES: Chronic | ICD-10-CM

## 2021-12-21 DIAGNOSIS — L72.9 FOLLICULAR CYST OF THE SKIN AND SUBCUTANEOUS TISSUE, UNSPECIFIED: Chronic | ICD-10-CM

## 2021-12-21 DIAGNOSIS — D48.9 NEOPLASM OF UNCERTAIN BEHAVIOR, UNSPECIFIED: Chronic | ICD-10-CM

## 2022-01-18 DIAGNOSIS — H90.3 SENSORINEURAL HEARING LOSS, BILATERAL: ICD-10-CM

## 2022-03-16 ENCOUNTER — APPOINTMENT (OUTPATIENT)
Dept: SPEECH THERAPY | Facility: CLINIC | Age: 62
End: 2022-03-16

## 2022-03-17 NOTE — DISCHARGE NOTE PROVIDER - CARE PROVIDERS DIRECT ADDRESSES
,DirectAddress_Unknown Complex Repair And V-Y Plasty Text: The defect edges were debeveled with a #15 scalpel blade.  The primary defect was closed partially with a complex linear closure.  Given the location of the remaining defect, shape of the defect and the proximity to free margins a V-Y plasty was deemed most appropriate for complete closure of the defect.  Using a sterile surgical marker, an appropriate advancement flap was drawn incorporating the defect and placing the expected incisions within the relaxed skin tension lines where possible.    The area thus outlined was incised deep to adipose tissue with a #15 scalpel blade.  The skin margins were undermined to an appropriate distance in all directions utilizing iris scissors.

## 2022-08-30 ENCOUNTER — NON-APPOINTMENT (OUTPATIENT)
Age: 62
End: 2022-08-30

## 2022-08-31 NOTE — DISCHARGE NOTE NURSING/CASE MANAGEMENT/SOCIAL WORK - MODE OF TRANSPORTATION
Detail Level: Detailed Consent: The patient's consent was obtained including but not limited to risks of crusting, scabbing, blistering, scarring, darker or lighter pigmentary change, recurrence, incomplete removal and infection. Medical Necessity Information: It is in your best interest to select a reason for this procedure from the list below. All of these items fulfill various CMS LCD requirements except the new and changing color options. Add 52 Modifier (Optional): no Show Applicator Variable?: Yes Medical Necessity Clause: This procedure was medically necessary because the lesions that were treated were: itchy/irritating Post-Care Instructions: I reviewed with the patient in detail post-care instructions. Patient is to wear sunprotection, and avoid picking at any of the treated lesions. Pt may apply Vaseline to crusted or scabbing areas. Spray Paint Text: The liquid nitrogen was applied to the skin utilizing a spray paint frosting technique. Wheelchair/Stroller

## 2022-09-14 ENCOUNTER — OUTPATIENT (OUTPATIENT)
Dept: OUTPATIENT SERVICES | Facility: HOSPITAL | Age: 62
LOS: 1 days | Discharge: HOME | End: 2022-09-14

## 2022-09-14 ENCOUNTER — APPOINTMENT (OUTPATIENT)
Dept: SPEECH THERAPY | Facility: CLINIC | Age: 62
End: 2022-09-14

## 2022-09-14 DIAGNOSIS — Z98.49 CATARACT EXTRACTION STATUS, UNSPECIFIED EYE: Chronic | ICD-10-CM

## 2022-09-14 DIAGNOSIS — Z98.89 OTHER SPECIFIED POSTPROCEDURAL STATES: Chronic | ICD-10-CM

## 2022-09-14 DIAGNOSIS — L72.9 FOLLICULAR CYST OF THE SKIN AND SUBCUTANEOUS TISSUE, UNSPECIFIED: Chronic | ICD-10-CM

## 2022-09-14 DIAGNOSIS — J34.2 DEVIATED NASAL SEPTUM: Chronic | ICD-10-CM

## 2022-09-14 DIAGNOSIS — D48.9 NEOPLASM OF UNCERTAIN BEHAVIOR, UNSPECIFIED: Chronic | ICD-10-CM

## 2022-09-14 DIAGNOSIS — Z98.890 OTHER SPECIFIED POSTPROCEDURAL STATES: Chronic | ICD-10-CM

## 2022-09-14 DIAGNOSIS — Z87.898 PERSONAL HISTORY OF OTHER SPECIFIED CONDITIONS: Chronic | ICD-10-CM

## 2022-09-21 DIAGNOSIS — H90.3 SENSORINEURAL HEARING LOSS, BILATERAL: ICD-10-CM

## 2022-11-28 ENCOUNTER — APPOINTMENT (OUTPATIENT)
Dept: SPEECH THERAPY | Facility: CLINIC | Age: 62
End: 2022-11-28

## 2022-11-28 NOTE — ASSESSMENT
[FreeTextEntry1] : EQUIPMENT\par Right CI\par : Med El\par Internal: Synchrony Flex 28\par Implant date: July 2020\par Surgeon: Dr. Draper\par Processor type(s): Gutierrez 2, Sonnet 2\par Serial numbers: [id number], [id number]\par Magnet strength/color: x3 (triangles)/black\par \par Left HA- Phonak Audeo FUAD\par \par MAPPING\par Right CI\par Impedances: WNL all electrodes\par De-activated electrodes: e1 on 3/16/22 (high compared to neighboring electrodes). Enabled it today to try- re-measured per patient request to try. Will keep one program with it off.\par P1: #51/55- Ms measured and adjusted individually. Ts swept to make sure inaudible.\par P2: #52/56- Same as P1 with e12 OFF\par P3: #53/57- Ms dec by 2 clicks off P1\par P4: #54/58- S-shaped maplaw to try in noise\par Volume/sensitivity: 90%, 75%\par Processing strategy: FSP\par \par Significant decrease in Ms overall again today when measured individually. This also occurred at previous mapping session. Patient reported comfort and balance with new P1/P2. Patient reported program to sound very similar with e12 on and off.\par \par Changed magnet from x4 to x3 today.

## 2022-11-28 NOTE — PLAN
[FreeTextEntry2] : 1. Continue full time use of CI + HA.\par 2. F/up as scheduled (March 2023)- eval, sooner if needed. \par 3. Continued otologic management.

## 2022-11-28 NOTE — HISTORY OF PRESENT ILLNESS
[FreeTextEntry1] : Mr. Matthews, a 61 year old male, has a history of progressive asymmetrical sensorineural hearing loss since May 2015. Fit with CROS in the past. Mr. Matthews received a cochlear implant for his right ear in July 2020. He continues to use a traditional hearing aid for his left ear. Mr. Matthews reported tinnitus right ear and balance issues. [FreeTextEntry8] : Seen today for mapping, feels needs changes to programs. Feels bass and trepedro are too strong. Patient no longer using streamer- uses over the ear headphones instead.

## 2022-12-21 ENCOUNTER — APPOINTMENT (OUTPATIENT)
Dept: SPEECH THERAPY | Facility: CLINIC | Age: 62
End: 2022-12-21

## 2023-01-24 ENCOUNTER — APPOINTMENT (OUTPATIENT)
Dept: SPEECH THERAPY | Facility: CLINIC | Age: 63
End: 2023-01-24

## 2023-02-24 ENCOUNTER — NON-APPOINTMENT (OUTPATIENT)
Age: 63
End: 2023-02-24

## 2023-03-05 ENCOUNTER — NON-APPOINTMENT (OUTPATIENT)
Age: 63
End: 2023-03-05

## 2023-03-08 ENCOUNTER — APPOINTMENT (OUTPATIENT)
Dept: SPEECH THERAPY | Facility: CLINIC | Age: 63
End: 2023-03-08

## 2023-03-15 ENCOUNTER — APPOINTMENT (OUTPATIENT)
Dept: SPEECH THERAPY | Facility: CLINIC | Age: 63
End: 2023-03-15

## 2023-03-15 ENCOUNTER — OUTPATIENT (OUTPATIENT)
Dept: OUTPATIENT SERVICES | Facility: HOSPITAL | Age: 63
LOS: 1 days | Discharge: HOME | End: 2023-03-15

## 2023-03-15 DIAGNOSIS — Z98.89 OTHER SPECIFIED POSTPROCEDURAL STATES: Chronic | ICD-10-CM

## 2023-03-15 DIAGNOSIS — Z98.49 CATARACT EXTRACTION STATUS, UNSPECIFIED EYE: Chronic | ICD-10-CM

## 2023-03-15 DIAGNOSIS — L72.9 FOLLICULAR CYST OF THE SKIN AND SUBCUTANEOUS TISSUE, UNSPECIFIED: Chronic | ICD-10-CM

## 2023-03-15 DIAGNOSIS — Z87.898 PERSONAL HISTORY OF OTHER SPECIFIED CONDITIONS: Chronic | ICD-10-CM

## 2023-03-15 DIAGNOSIS — D48.9 NEOPLASM OF UNCERTAIN BEHAVIOR, UNSPECIFIED: Chronic | ICD-10-CM

## 2023-03-15 DIAGNOSIS — J34.2 DEVIATED NASAL SEPTUM: Chronic | ICD-10-CM

## 2023-03-15 DIAGNOSIS — Z98.890 OTHER SPECIFIED POSTPROCEDURAL STATES: Chronic | ICD-10-CM

## 2023-03-20 DIAGNOSIS — H90.3 SENSORINEURAL HEARING LOSS, BILATERAL: ICD-10-CM

## 2023-04-25 NOTE — H&P PST ADULT - SURGICAL SITE INCISION
[No] : No [No falls in past year] : Patient reported no falls in the past year [Current] : Current [20 or more] : 20 or more no

## 2023-09-20 ENCOUNTER — APPOINTMENT (OUTPATIENT)
Dept: SPEECH THERAPY | Facility: CLINIC | Age: 63
End: 2023-09-20

## 2023-09-20 ENCOUNTER — OUTPATIENT (OUTPATIENT)
Dept: OUTPATIENT SERVICES | Facility: HOSPITAL | Age: 63
LOS: 1 days | Discharge: HOME | End: 2023-09-20

## 2023-09-20 DIAGNOSIS — Z98.89 OTHER SPECIFIED POSTPROCEDURAL STATES: Chronic | ICD-10-CM

## 2023-09-20 DIAGNOSIS — J34.2 DEVIATED NASAL SEPTUM: Chronic | ICD-10-CM

## 2023-09-20 DIAGNOSIS — D48.9 NEOPLASM OF UNCERTAIN BEHAVIOR, UNSPECIFIED: Chronic | ICD-10-CM

## 2023-09-20 DIAGNOSIS — Z87.898 PERSONAL HISTORY OF OTHER SPECIFIED CONDITIONS: Chronic | ICD-10-CM

## 2023-09-20 DIAGNOSIS — Z98.890 OTHER SPECIFIED POSTPROCEDURAL STATES: Chronic | ICD-10-CM

## 2023-09-20 DIAGNOSIS — Z98.49 CATARACT EXTRACTION STATUS, UNSPECIFIED EYE: Chronic | ICD-10-CM

## 2023-09-20 DIAGNOSIS — L72.9 FOLLICULAR CYST OF THE SKIN AND SUBCUTANEOUS TISSUE, UNSPECIFIED: Chronic | ICD-10-CM

## 2024-04-03 ENCOUNTER — APPOINTMENT (OUTPATIENT)
Dept: RADIOLOGY | Facility: CLINIC | Age: 64
End: 2024-04-03
Payer: COMMERCIAL

## 2024-04-03 ENCOUNTER — OUTPATIENT (OUTPATIENT)
Dept: OUTPATIENT SERVICES | Facility: HOSPITAL | Age: 64
LOS: 1 days | End: 2024-04-03
Payer: COMMERCIAL

## 2024-04-03 DIAGNOSIS — R53.83 OTHER FATIGUE: ICD-10-CM

## 2024-04-03 DIAGNOSIS — Z98.89 OTHER SPECIFIED POSTPROCEDURAL STATES: Chronic | ICD-10-CM

## 2024-04-03 DIAGNOSIS — Z98.890 OTHER SPECIFIED POSTPROCEDURAL STATES: Chronic | ICD-10-CM

## 2024-04-03 DIAGNOSIS — E11.9 TYPE 2 DIABETES MELLITUS WITHOUT COMPLICATIONS: ICD-10-CM

## 2024-04-03 DIAGNOSIS — E29.1 TESTICULAR HYPOFUNCTION: ICD-10-CM

## 2024-04-03 DIAGNOSIS — E78.2 MIXED HYPERLIPIDEMIA: ICD-10-CM

## 2024-04-03 DIAGNOSIS — Z00.8 ENCOUNTER FOR OTHER GENERAL EXAMINATION: ICD-10-CM

## 2024-04-03 DIAGNOSIS — L72.9 FOLLICULAR CYST OF THE SKIN AND SUBCUTANEOUS TISSUE, UNSPECIFIED: Chronic | ICD-10-CM

## 2024-04-03 DIAGNOSIS — J34.2 DEVIATED NASAL SEPTUM: Chronic | ICD-10-CM

## 2024-04-03 DIAGNOSIS — Z87.898 PERSONAL HISTORY OF OTHER SPECIFIED CONDITIONS: Chronic | ICD-10-CM

## 2024-04-03 DIAGNOSIS — D48.9 NEOPLASM OF UNCERTAIN BEHAVIOR, UNSPECIFIED: Chronic | ICD-10-CM

## 2024-04-03 DIAGNOSIS — E11.65 TYPE 2 DIABETES MELLITUS WITH HYPERGLYCEMIA: ICD-10-CM

## 2024-04-03 DIAGNOSIS — I10 ESSENTIAL (PRIMARY) HYPERTENSION: ICD-10-CM

## 2024-04-03 DIAGNOSIS — Z98.49 CATARACT EXTRACTION STATUS, UNSPECIFIED EYE: Chronic | ICD-10-CM

## 2024-04-03 DIAGNOSIS — E66.9 OBESITY, UNSPECIFIED: ICD-10-CM

## 2024-04-03 PROCEDURE — 77080 DXA BONE DENSITY AXIAL: CPT

## 2024-04-03 PROCEDURE — 77080 DXA BONE DENSITY AXIAL: CPT | Mod: 26

## 2024-05-05 NOTE — ED ADULT NURSE NOTE - NS_HUMANTRAFFICKQ2_ED_ALL_ED
Received report from REJI LEE and assumed care of pt. Pt is AxOx3, breathing even and unlabored, reports mild headache. Upon assesment pt has staples on top of head from fall. Awaiting CT results.  Pt is aware of plan of care.
Upon assessment of pt, pt has increased bleeding by staples in head. Ambulance pickup  was delayed. Md. Orosco notified and came to assess pt.
No

## 2024-07-11 NOTE — ED PROVIDER NOTE - NS ED MD EM SELECTION
Patient: Claudette Gramajo    Procedure Summary       Date: 07/11/24 Room / Location: [facility] OR  / [facility]    Anesthesia Start: 0853 Anesthesia Stop: 0954    Procedures:       CUBITAL TUNNEL RELEASE (Right)      CARPAL TUNNEL RELEASE (Right: Wrist) Diagnosis:       Carpal tunnel syndrome, bilateral      (Carpal tunnel syndrome, bilateral [G56.03])    Surgeons: Alexis Hawkins MD Provider: Rubi Dumont MD    Anesthesia Type: general, MAC ASA Status: 3            Anesthesia Type: general, MAC    Vitals  Vitals Value Taken Time   /100 07/11/24 1025   Temp 35.8 °C (96.5 °F) 07/11/24 0955   Pulse 66 07/11/24 1025   Resp 21 07/11/24 0955   SpO2 99 % 07/11/24 1025   Vitals shown include unfiled device data.        Post Anesthesia Care and Evaluation    Patient location during evaluation: bedside  Patient participation: complete - patient participated  Level of consciousness: awake  Pain management: adequate    Airway patency: patent  PONV Status: none  Cardiovascular status: acceptable and stable  Respiratory status: acceptable  Hydration status: acceptable     [id number] Detailed

## 2024-12-18 NOTE — BRIEF OPERATIVE NOTE - VENOUS THROMBOEMBOLISM PROPHYLAXIS THERAPY
Wt Readings from Last 3 Encounters:   12/18/24 136 kg (299 lb 3.2 oz)   10/03/24 (!) 137 kg (303 lb)   08/28/24 (!) 137 kg (303 lb)             SCD

## 2025-02-26 NOTE — DISCHARGE NOTE PROVIDER - NSDCMRMEDTOKEN_GEN_ALL_CORE_FT
Continue encouraging oral hydration, fiber intake.  Follow-up with pediatrician to check in in the next couple days.  Return to ED for new or worsening symptoms.  
AndroGel Pump 20.25 mg/actuation (1.62%) transdermal gel: 4 pump(s) transdermal once a day (in the morning)  Aspirin Enteric Coated 81 mg oral delayed release tablet: 1 tab(s) orally once a day  carbinoxamine 4 mg oral tablet: 1 tab(s) orally 2 times a day  ***pt will bring own***  Cialis 5 mg oral tablet: 1 tab(s) orally once a day  Crestor 20 mg oral tablet: 1 tab(s) orally once a day (at bedtime)  ertapenem 1 g injection: 1 gram(s) intravenous once a day  Flomax 0.4 mg oral capsule: 1 cap(s) orally once a day  meloxicam 15 mg oral tablet: 1 tab(s) orally once a day  metFORMIN 500 mg oral tablet: 1 tab(s) orally once a day (in the morning)  metFORMIN 500 mg oral tablet: 3 tab(s) orally once a day (in the evening)  Multiple Vitamins oral tablet: 1 tab(s) orally once a day  omeprazole 20 mg oral delayed release capsule: 1 cap(s) orally once a day  Singulair 10 mg oral tablet: 1 tab(s) orally once a day (in the evening)  telmisartan 80 mg oral tablet: 1 tab(s) orally once a day

## 2025-06-16 ENCOUNTER — APPOINTMENT (OUTPATIENT)
Dept: PAIN MANAGEMENT | Facility: CLINIC | Age: 65
End: 2025-06-16
Payer: COMMERCIAL

## 2025-06-16 VITALS — BODY MASS INDEX: 28.58 KG/M2 | HEIGHT: 69 IN | WEIGHT: 193 LBS

## 2025-06-16 PROCEDURE — 99204 OFFICE O/P NEW MOD 45 MIN: CPT

## 2025-07-08 ENCOUNTER — APPOINTMENT (OUTPATIENT)
Dept: PAIN MANAGEMENT | Facility: CLINIC | Age: 65
End: 2025-07-08
Payer: COMMERCIAL

## 2025-07-08 PROCEDURE — 64636Z: CUSTOM | Mod: 50,59

## 2025-07-08 PROCEDURE — J3490M: CUSTOM

## 2025-07-08 PROCEDURE — 64635 DESTROY LUMB/SAC FACET JNT: CPT | Mod: 50

## 2025-07-08 PROCEDURE — 82948 REAGENT STRIP/BLOOD GLUCOSE: CPT

## 2025-07-22 ENCOUNTER — APPOINTMENT (OUTPATIENT)
Dept: PAIN MANAGEMENT | Facility: CLINIC | Age: 65
End: 2025-07-22
Payer: COMMERCIAL

## 2025-07-22 VITALS — HEIGHT: 69 IN | BODY MASS INDEX: 28.58 KG/M2 | WEIGHT: 193 LBS

## 2025-07-22 DIAGNOSIS — M47.816 SPONDYLOSIS W/OUT MYELOPATHY OR RADICULOPATHY, LUMBAR REGION: ICD-10-CM

## 2025-07-22 PROCEDURE — 99214 OFFICE O/P EST MOD 30 MIN: CPT
